# Patient Record
Sex: MALE | Race: BLACK OR AFRICAN AMERICAN | Employment: FULL TIME | URBAN - METROPOLITAN AREA
[De-identification: names, ages, dates, MRNs, and addresses within clinical notes are randomized per-mention and may not be internally consistent; named-entity substitution may affect disease eponyms.]

---

## 2018-03-31 ENCOUNTER — ED HISTORICAL/CONVERTED ENCOUNTER (OUTPATIENT)
Dept: OTHER | Age: 43
End: 2018-03-31

## 2019-05-08 LAB — HBA1C MFR BLD HPLC: NORMAL %

## 2019-07-24 ENCOUNTER — ED HISTORICAL/CONVERTED ENCOUNTER (OUTPATIENT)
Dept: OTHER | Age: 44
End: 2019-07-24

## 2019-07-24 LAB
ANION GAP BLD CALC-SCNC: 5 MMOL/L
BUN BLD-MCNC: 11 MG/DL
BUN/CREATININE RATIO, EXTERNAL: 7
CALCIUM, EXTERNAL: 9.1
CHLORIDE, EXTERNAL: 103
CO2, EXTERNAL: 30
CREATININE SER, EXTERNAL: 1.5 (ref ?–1.5)
EGFR IF AFA, EXTERNAL: >60
EGFR NOT AFA, EXTERNAL: 57 (ref 57–?)
GLUCOSE SER, EXTERNAL: 126 (ref ?–126)
POTASSIUM, EXTERNAL: 3.8
SODIUM, EXTERNAL: 138

## 2019-08-05 ENCOUNTER — OP HISTORICAL/CONVERTED ENCOUNTER (OUTPATIENT)
Dept: OTHER | Age: 44
End: 2019-08-05

## 2019-08-14 ENCOUNTER — OP HISTORICAL/CONVERTED ENCOUNTER (OUTPATIENT)
Dept: OTHER | Age: 44
End: 2019-08-14

## 2019-08-24 ENCOUNTER — ED HISTORICAL/CONVERTED ENCOUNTER (OUTPATIENT)
Dept: OTHER | Age: 44
End: 2019-08-24

## 2020-03-29 ENCOUNTER — OP HISTORICAL/CONVERTED ENCOUNTER (OUTPATIENT)
Dept: OTHER | Age: 45
End: 2020-03-29

## 2020-06-02 ENCOUNTER — OP HISTORICAL/CONVERTED ENCOUNTER (OUTPATIENT)
Dept: OTHER | Age: 45
End: 2020-06-02

## 2020-07-01 ENCOUNTER — OP HISTORICAL/CONVERTED ENCOUNTER (OUTPATIENT)
Dept: OTHER | Age: 45
End: 2020-07-01

## 2020-07-28 ENCOUNTER — TELEPHONE (OUTPATIENT)
Dept: FAMILY MEDICINE CLINIC | Age: 45
End: 2020-07-28

## 2020-07-28 NOTE — TELEPHONE ENCOUNTER
Patient called requesting a \"refill\" and an appointment. I returned his call and LVM stating I needed to know what medication he needs refilled and to schedule his appointment with the .

## 2020-07-29 ENCOUNTER — TELEPHONE (OUTPATIENT)
Dept: FAMILY MEDICINE CLINIC | Age: 45
End: 2020-07-29

## 2020-07-29 NOTE — TELEPHONE ENCOUNTER
Patient called and LVM on 07/29/2020 @ 1:52 PM stating he needs a refill on his \"stomach medication\".

## 2020-08-12 ENCOUNTER — OP HISTORICAL/CONVERTED ENCOUNTER (OUTPATIENT)
Dept: OTHER | Age: 45
End: 2020-08-12

## 2020-08-18 RX ORDER — LEVOTHYROXINE SODIUM 125 UG/1
TABLET ORAL
Qty: 90 TAB | Refills: 0 | Status: SHIPPED | OUTPATIENT
Start: 2020-08-18 | End: 2020-11-06 | Stop reason: SDUPTHER

## 2020-09-22 ENCOUNTER — TELEPHONE (OUTPATIENT)
Dept: FAMILY MEDICINE CLINIC | Age: 45
End: 2020-09-22

## 2020-09-22 NOTE — TELEPHONE ENCOUNTER
Patient called requesting a call back for an appointment. I returned his call and advised him to call the  to schedule. Clinical staff are currently not scheduling due to the new system. Patient voiced understanding.

## 2020-09-24 VITALS
TEMPERATURE: 98 F | DIASTOLIC BLOOD PRESSURE: 80 MMHG | HEIGHT: 71 IN | BODY MASS INDEX: 36.4 KG/M2 | SYSTOLIC BLOOD PRESSURE: 120 MMHG | WEIGHT: 260 LBS | HEART RATE: 70 BPM

## 2020-09-24 PROBLEM — R07.89 ATYPICAL CHEST PAIN: Status: ACTIVE | Noted: 2020-09-24

## 2020-09-24 PROBLEM — I10 ESSENTIAL HYPERTENSION: Status: ACTIVE | Noted: 2020-09-24

## 2020-09-24 PROBLEM — D72.819 LEUKOPENIA: Status: ACTIVE | Noted: 2020-09-24

## 2020-09-24 PROBLEM — E03.9 THYROID ACTIVITY DECREASED: Status: ACTIVE | Noted: 2020-09-24

## 2020-09-24 PROBLEM — R94.39 CARDIOVASCULAR STRESS TEST ABNORMAL: Status: ACTIVE | Noted: 2020-09-24

## 2020-09-24 PROBLEM — M54.17 LUMBOSACRAL RADICULOPATHY: Status: ACTIVE | Noted: 2020-09-24

## 2020-09-24 PROBLEM — R06.83 SNORING: Status: ACTIVE | Noted: 2020-09-24

## 2020-09-24 PROBLEM — N41.1 CHRONIC PROSTATITIS: Status: ACTIVE | Noted: 2020-09-24

## 2020-09-24 PROBLEM — M25.561 RIGHT KNEE PAIN: Status: ACTIVE | Noted: 2020-09-24

## 2020-09-24 PROBLEM — N18.2 CHRONIC KIDNEY DISEASE, STAGE 2 (MILD): Status: ACTIVE | Noted: 2020-09-24

## 2020-09-24 RX ORDER — KETOROLAC TROMETHAMINE 10 MG/1
TABLET, FILM COATED ORAL
COMMUNITY
End: 2020-11-06

## 2020-09-24 RX ORDER — METHOCARBAMOL 500 MG/1
TABLET, FILM COATED ORAL 4 TIMES DAILY
COMMUNITY
End: 2020-11-06

## 2020-09-24 RX ORDER — DICLOFENAC SODIUM 10 MG/G
GEL TOPICAL 4 TIMES DAILY
COMMUNITY
End: 2020-11-06

## 2020-09-24 RX ORDER — PANTOPRAZOLE SODIUM 40 MG/1
40 TABLET, DELAYED RELEASE ORAL DAILY
COMMUNITY
End: 2020-10-09 | Stop reason: SDUPTHER

## 2020-09-24 RX ORDER — LACTULOSE 10 G/15ML
SOLUTION ORAL; RECTAL 3 TIMES DAILY
COMMUNITY
End: 2021-05-11 | Stop reason: ALTCHOICE

## 2020-09-24 RX ORDER — LISINOPRIL 40 MG/1
40 TABLET ORAL DAILY
COMMUNITY
End: 2020-12-23

## 2020-09-24 RX ORDER — TAMSULOSIN HYDROCHLORIDE 0.4 MG/1
0.4 CAPSULE ORAL DAILY
COMMUNITY
End: 2021-02-05

## 2020-09-24 RX ORDER — IBUPROFEN 800 MG/1
TABLET ORAL
COMMUNITY
End: 2020-11-06

## 2020-09-24 RX ORDER — HYDROCORTISONE ACETATE 25 MG/1
25 SUPPOSITORY RECTAL EVERY 12 HOURS
COMMUNITY
End: 2020-11-06

## 2020-09-25 ENCOUNTER — VIRTUAL VISIT (OUTPATIENT)
Dept: FAMILY MEDICINE CLINIC | Age: 45
End: 2020-09-25

## 2020-09-25 DIAGNOSIS — E03.9 ACQUIRED HYPOTHYROIDISM: ICD-10-CM

## 2020-09-25 DIAGNOSIS — K21.9 GASTROESOPHAGEAL REFLUX DISEASE WITHOUT ESOPHAGITIS: ICD-10-CM

## 2020-09-25 DIAGNOSIS — M54.17 LUMBOSACRAL RADICULOPATHY: ICD-10-CM

## 2020-09-25 DIAGNOSIS — N40.0 BENIGN PROSTATIC HYPERPLASIA WITHOUT LOWER URINARY TRACT SYMPTOMS: ICD-10-CM

## 2020-09-25 DIAGNOSIS — I10 ESSENTIAL HYPERTENSION: Primary | ICD-10-CM

## 2020-09-25 NOTE — PATIENT INSTRUCTIONS
Patient currently at the hospital, has been instructed to give us a call once he gets discharge to go over medication from the hospital, discharge medications, discharge summaries as well as reevaluation of his chronic conditions, will address any refills as they may deem appropriate at that time

## 2020-09-25 NOTE — PROGRESS NOTES
Subjective:     Leah Copeland is a 41 y.o. male who presents for follow up of hypertension, hyperlipidemia and thyroidism. Diet and Lifestyle: generally follows a low fat low cholesterol diet, generally follows a low sodium diet, exercises regularly, smoker . Home BP Monitoring: is well controlled at home, ranging 120's/80's    Cardiovascular ROS: taking medications as instructed, no medication side effects noted, no TIA's, no chest pain on exertion, no dyspnea on exertion, no swelling of ankles. New concerns: Was recently diagnosed with COVID-19 on 9/14/2020. In the hospital since 9/23/2020, has some kidney damage due to dehydration, CT scan of the chest also shows kidney damage, but getting better. Not eating much, little cough, has pneumonia on the left lung. Patient Active Problem List   Diagnosis Code    Thyroid activity decreased E03.9    Right knee pain M25.561    Atypical chest pain R07.89    Cardiovascular stress test abnormal R94.39    Chronic kidney disease, stage 2 (mild) N18.2    Chronic prostatitis N41.1    Essential hypertension I10    Leukopenia D72.819    Lumbosacral radiculopathy M54.17    Snoring R06.83     Patient Active Problem List    Diagnosis Date Noted    Thyroid activity decreased 09/24/2020    Right knee pain 09/24/2020    Atypical chest pain 09/24/2020    Cardiovascular stress test abnormal 09/24/2020    Chronic kidney disease, stage 2 (mild) 09/24/2020    Chronic prostatitis 09/24/2020    Essential hypertension 09/24/2020    Leukopenia 09/24/2020    Lumbosacral radiculopathy 09/24/2020    Snoring 09/24/2020     Current Outpatient Medications   Medication Sig Dispense Refill    diclofenac (VOLTAREN) 1 % gel Apply  to affected area four (4) times daily.  hydrocortisone (ANUSOL-HC) 25 mg supp Insert 25 mg into rectum every twelve (12) hours.  ibuprofen (MOTRIN) 800 mg tablet Take  by mouth.  ketorolac (TORADOL) 10 mg tablet Take  by mouth.  lactulose (CHRONULAC) 10 gram/15 mL solution Take  by mouth three (3) times daily.  lisinopriL (PRINIVIL, ZESTRIL) 40 mg tablet Take 40 mg by mouth daily.  methocarbamoL (ROBAXIN) 500 mg tablet Take  by mouth four (4) times daily.  pantoprazole (PROTONIX) 40 mg tablet Take 40 mg by mouth daily.  tamsulosin (FLOMAX) 0.4 mg capsule Take 0.4 mg by mouth daily.  levothyroxine (SYNTHROID) 125 mcg tablet TAKE 1 TABLET BY MOUTH EVERY DAY *TAPER DOSE* 90 Tab 0     Allergies   Allergen Reactions    Vicodin [Hydrocodone-Acetaminophen] Other (comments)     hallucinations     Past Medical History:   Diagnosis Date    Atypical chest pain 2020    Cardiovascular stress test abnormal 2020    Chronic kidney disease, stage 2 (mild) 2020    Chronic prostatitis 2020    Dizziness     Essential hypertension 2020    Leukopenia 2020    Lumbosacral radiculopathy 2020    Right knee pain 2020    Snoring 2020    Stomach ulcer     Thyroid activity decreased 2020     Past Surgical History:   Procedure Laterality Date    HX WISDOM TEETH EXTRACTION       Family History   Problem Relation Age of Onset    Hypertension Mother     Diabetes Father         insulin dependent     Hypertension Father     Stroke Paternal Aunt 80    Cancer Paternal Uncle         liver cancer (heavy drinker)     Social History     Tobacco Use    Smoking status: Former Smoker     Last attempt to quit: 2009     Years since quittin.7    Smokeless tobacco: Never Used   Substance Use Topics    Alcohol use: Yes     Comment: Last drink 2019 at his sister's weddding.  3-4 mixed drinks on the weekends, distant history of heavy drinking and drug use, quit all 2003        No results found for: WBC, WBCT, WBCPOC, HGB, HGBPOC, HCT, HCTPOC, PLT, PLTPOC, MCV, MCVPOC, HGBEXT, HCTEXT, PLTEXT  Lab Results   Component Value Date/Time    Hemoglobin A1c, External A1C 5.9  EAG 123 05/08/2019      No results found for: CHOL, CHOLPOCT, HDL, LDL, LDLC, LDLCPOC, LDLCEXT, TRIGL, TGLPOCT, CHHD, CHHDX  No results found for: TSH, TSH2, TSH3, TSHP, TSHELE, TSHEXT, TT3, T3U, T3UP, FRT3, FT3, FT4, FT4P, T4, T4P, FT4T, TT7, TSHEXT      Review of Systems, additional:  A comprehensive review of systems was negative except for that written in the HPI. Objective: There were no vitals taken for this visit. Appearance: oriented to person, place, and time, normal appearing weight and acyanotic, in no respiratory distress. Lab review: labs are reviewed, up to date and normal.     Assessment/Plan:     Patient currently at the hospital, has been instructed to give us a call once he gets discharge to go over medication from the hospital, discharge medications, discharge summaries as well as reevaluation of his chronic conditions, will address any refills as they may deem appropriate at that time    ICD-10-CM ICD-9-CM    1. Essential hypertension  I10 401.9    2. Acquired hypothyroidism  E03.9 244.9    3. Lumbosacral radiculopathy  M54.17 724.4    4. Benign prostatic hyperplasia without lower urinary tract symptoms  N40.0 600.00    5.  Gastroesophageal reflux disease without esophagitis  K21.9 530.81

## 2020-10-09 ENCOUNTER — VIRTUAL VISIT (OUTPATIENT)
Dept: FAMILY MEDICINE CLINIC | Age: 45
End: 2020-10-09
Payer: MEDICAID

## 2020-10-09 DIAGNOSIS — I10 HYPERTENSION, ESSENTIAL: ICD-10-CM

## 2020-10-09 DIAGNOSIS — E53.8 B12 DEFICIENCY: ICD-10-CM

## 2020-10-09 DIAGNOSIS — U07.1 CLINICAL DIAGNOSIS OF COVID-19: ICD-10-CM

## 2020-10-09 DIAGNOSIS — E55.9 VITAMIN D DEFICIENCY: Primary | ICD-10-CM

## 2020-10-09 DIAGNOSIS — K21.9 GASTROESOPHAGEAL REFLUX DISEASE WITHOUT ESOPHAGITIS: ICD-10-CM

## 2020-10-09 PROCEDURE — 1111F DSCHRG MED/CURRENT MED MERGE: CPT | Performed by: NURSE PRACTITIONER

## 2020-10-09 PROCEDURE — 99495 TRANSJ CARE MGMT MOD F2F 14D: CPT | Performed by: NURSE PRACTITIONER

## 2020-10-09 RX ORDER — MELATONIN
1000 DAILY
Qty: 90 TAB | Refills: 0 | Status: SHIPPED | OUTPATIENT
Start: 2020-10-09 | End: 2020-11-06

## 2020-10-09 RX ORDER — PANTOPRAZOLE SODIUM 40 MG/1
40 TABLET, DELAYED RELEASE ORAL DAILY
Qty: 90 TAB | Refills: 3 | Status: SHIPPED | OUTPATIENT
Start: 2020-10-09 | End: 2020-10-28 | Stop reason: ALTCHOICE

## 2020-10-09 RX ORDER — LANOLIN ALCOHOL/MO/W.PET/CERES
500 CREAM (GRAM) TOPICAL DAILY
Qty: 90 TAB | Refills: 0 | Status: SHIPPED | OUTPATIENT
Start: 2020-10-09 | End: 2021-01-07

## 2020-10-09 NOTE — PATIENT INSTRUCTIONS
At this time still recovering from [place] will continue to monitor symptoms. Medication has been reviewed and discussed with patient We will continue with amlodipine for blood pressure management and Eliquis for prophylaxis of blood clots At this time okay to resume with Protonix for acid reflux We will do labs to further evaluate chronic conditions Continue with all other medication as directed Follow-up in 1 month for reevaluation

## 2020-10-09 NOTE — PROGRESS NOTES
Transitional Care Management Progress Note    Patient: Junaid Pickett : 1975  PCP: Salina Bernardo NP    Date of admission:   Date of discharge:     Patient was contacted by Transitional Care Management services within two days after his discharge: Yes. This encounter and supporting documentation was reviewed if available. Medication reconciliation was performed today (10/9/2020). Assessment/Plan:     Diagnoses and all orders for this visit:    1. Vitamin D deficiency  -     VITAMIN D, 25 HYDROXY  -     [place] DISCHARGE MEDS RECONCILED W/ CURRENT OUTPATIENT MED LIST    2. Gastroesophageal reflux disease without esophagitis  -     pantoprazole (PROTONIX) 40 mg tablet; Take 1 Tab by mouth daily.  -     AZ DISCHARGE MEDS RECONCILED W/ CURRENT OUTPATIENT MED LIST    3. Hypertension, essential  -     CBC WITH AUTOMATED DIFF  -     METABOLIC PANEL, COMPREHENSIVE  -     LIPID PANEL  -     THYROID CASCADE PROFILE  -     MICROALBUMIN, UR, RAND W/ MICROALB/CREAT RATIO  -     AZ DISCHARGE MEDS RECONCILED W/ CURRENT OUTPATIENT MED LIST    4. B12 deficiency  -     VITAMIN B12  -     AZ DISCHARGE MEDS RECONCILED W/ CURRENT OUTPATIENT MED LIST    5. Clinical diagnosis of COVID-19  -     [place] DISCHARGE MEDS RECONCILED W/ CURRENT OUTPATIENT MED LIST      The complexity of medical decision making for this patient's transitional care is moderate   Follow-up and Dispositions    · Return in about 4 weeks (around 2020) for in person visit, post covid, lab review, post ANGELO covid. Subjective:   Junaid Gomez. is a 41 y.o. male presenting today for follow-up after being discharged from [facility]. The discharge summary was reviewed or requested. The main problem requiring admission was Covid 19 complications acute kidney injury.    Complications during admission: none    Interval history/Current status: D/c on 2020, still having headache, lost 44 lbs, have not much appetite currently only eating chicken and mash potatoes, has f/u with ortho on 10/13/2020, has follow-up with kidney specialist on November 3  The hospital stopped lisinopril, now amlodipine, eliquis 2.5,     Admitting symptoms have: significantly improved    Medications marked \"taking\" at this time:  Home Medications    Medication Sig Start Date End Date Taking? Authorizing Provider   pantoprazole (PROTONIX) 40 mg tablet Take 1 Tab by mouth daily. 10/9/20  Yes Leanna Muller NP   diclofenac (VOLTAREN) 1 % gel Apply  to affected area four (4) times daily. Provider, Historical   hydrocortisone (ANUSOL-HC) 25 mg supp Insert 25 mg into rectum every twelve (12) hours. Provider, Historical   ibuprofen (MOTRIN) 800 mg tablet Take  by mouth. Provider, Historical   ketorolac (TORADOL) 10 mg tablet Take  by mouth. Provider, Historical   lactulose (CHRONULAC) 10 gram/15 mL solution Take  by mouth three (3) times daily. Provider, Historical   lisinopriL (PRINIVIL, ZESTRIL) 40 mg tablet Take 40 mg by mouth daily. Provider, Historical   methocarbamoL (ROBAXIN) 500 mg tablet Take  by mouth four (4) times daily. Provider, Historical   tamsulosin (FLOMAX) 0.4 mg capsule Take 0.4 mg by mouth daily. Provider, Historical   pantoprazole (PROTONIX) 40 mg tablet Take 40 mg by mouth daily. 10/9/20  Provider, Historical   levothyroxine (SYNTHROID) 125 mcg tablet TAKE 1 TABLET BY MOUTH EVERY DAY *TAPER DOSE* 1/19/01   Jennyfer Sierra NP        Review of Systems:  Negative except leg, anorexia, headaches.       Patient Active Problem List   Diagnosis Code    Thyroid activity decreased E03.9    Right knee pain M25.561    Atypical chest pain R07.89    Cardiovascular stress test abnormal R94.39    Chronic kidney disease, stage 2 (mild) N18.2    Chronic prostatitis N41.1    Essential hypertension I10    Leukopenia D72.819    Lumbosacral radiculopathy M54.17    Snoring R06.83     Patient Active Problem List    Diagnosis Date Noted    Thyroid activity decreased 09/24/2020    Right knee pain 09/24/2020    Atypical chest pain 09/24/2020    Cardiovascular stress test abnormal 09/24/2020    Chronic kidney disease, stage 2 (mild) 09/24/2020    Chronic prostatitis 09/24/2020    Essential hypertension 09/24/2020    Leukopenia 09/24/2020    Lumbosacral radiculopathy 09/24/2020    Snoring 09/24/2020     Current Outpatient Medications   Medication Sig Dispense Refill    pantoprazole (PROTONIX) 40 mg tablet Take 1 Tab by mouth daily. 90 Tab 3    diclofenac (VOLTAREN) 1 % gel Apply  to affected area four (4) times daily.  hydrocortisone (ANUSOL-HC) 25 mg supp Insert 25 mg into rectum every twelve (12) hours.  ibuprofen (MOTRIN) 800 mg tablet Take  by mouth.  ketorolac (TORADOL) 10 mg tablet Take  by mouth.  lactulose (CHRONULAC) 10 gram/15 mL solution Take  by mouth three (3) times daily.  lisinopriL (PRINIVIL, ZESTRIL) 40 mg tablet Take 40 mg by mouth daily.  methocarbamoL (ROBAXIN) 500 mg tablet Take  by mouth four (4) times daily.  tamsulosin (FLOMAX) 0.4 mg capsule Take 0.4 mg by mouth daily.       levothyroxine (SYNTHROID) 125 mcg tablet TAKE 1 TABLET BY MOUTH EVERY DAY *TAPER DOSE* 90 Tab 0     Allergies   Allergen Reactions    Vicodin [Hydrocodone-Acetaminophen] Other (comments)     hallucinations     Past Medical History:   Diagnosis Date    Atypical chest pain 9/24/2020    Cardiovascular stress test abnormal 9/24/2020    Chronic kidney disease, stage 2 (mild) 9/24/2020    Chronic prostatitis 9/24/2020    Dizziness     Essential hypertension 9/24/2020    Leukopenia 9/24/2020    Lumbosacral radiculopathy 9/24/2020    Right knee pain 9/24/2020    Snoring 9/24/2020    Stomach ulcer     Thyroid activity decreased 9/24/2020     Past Surgical History:   Procedure Laterality Date    HX WISDOM TEETH EXTRACTION  2014     Family History   Problem Relation Age of Onset    Hypertension Mother    Neymar Schwarz Diabetes Father         insulin dependent     Hypertension Father     Stroke Paternal Aunt [address]    Cancer Paternal Uncle         liver cancer (heavy drinker)     Social History     Tobacco Use    Smoking status: Former Smoker     Last attempt to quit: 2009     Years since quittin.7    Smokeless tobacco: Never Used   Substance Use Topics    Alcohol use: Yes     Comment: Last drink 2019 at his sister's weddding. 3-4 mixed drinks on the weekends, distant history of heavy drinking and drug use, quit all 2003          Objective:     Patient-Reported Vitals 10/9/2020   Patient-Reported Weight 244   Patient-Reported Pulse 80   Patient-Reported Temperature 98.3   Patient-Reported Systolic  683   Patient-Reported Diastolic 80      General: alert, cooperative, no distress   Mental  status: normal mood, behavior, speech, dress, motor activity, and thought processes, able to follow commands   HENT: NCAT   Neck: no visualized mass   Resp: no respiratory distress   Neuro: no gross deficits   Skin: no discoloration or lesions of concern on visible areas   Psychiatric: normal affect, consistent with stated mood, no evidence of hallucinations     Additional exam findings: At this time still recovering from [facility] will continue to monitor symptoms. Medication has been reviewed and discussed with patient    We will continue with amlodipine for blood pressure management and Eliquis for prophylaxis of blood clots    At this time okay to resume with Protonix for acid reflux    We will do labs to further evaluate chronic conditions    Continue with all other medication as directed    Follow-up in 1 month for reevaluation      We discussed the expected course, resolution and complications of the diagnosis(es) in detail. Medication risks, benefits, costs, interactions, and alternatives were discussed as indicated.   I advised him to contact the office if his condition worsens, changes or fails to improve as anticipated. He expressed understanding with the diagnosis(es) and plan. Aye Rolle, who was evaluated through a synchronous (real-time) audio-video encounter and/or his healthcare decision maker, is aware that it is a billable service, with coverage as determined by his insurance carrier. He provided verbal consent to proceed: Yes, and patient identification was verified. It was conducted pursuant to the emergency declaration under the [address] [place] [address] and the [employer] General Act. A caregiver was present when appropriate. Ability to conduct physical exam was limited. I was in the office. The patient was at home.       Ayla Sherman NP

## 2020-10-14 ENCOUNTER — TELEPHONE (OUTPATIENT)
Dept: FAMILY MEDICINE CLINIC | Age: 45
End: 2020-10-14

## 2020-10-14 NOTE — TELEPHONE ENCOUNTER
Patient called and LVM questioning how to take the Vit D 3 - He was suppose to take 1000 daily but it says 25 mcg. I returned his call and explained it is 25  Mcg and 1000 units per tablet. To take one tablet as prescribed. Patient asked about B 12 being 500 mg and supposed to take 1000 and just wanted to verify the takes 2 tablets to equal the 1000 dose. Patient voiced understanding of how to take both medications.

## 2020-10-15 LAB
25(OH)D3+25(OH)D2 SERPL-MCNC: 18.2 NG/ML (ref 30–100)
ALBUMIN SERPL-MCNC: 4.6 G/DL (ref 4–5)
ALBUMIN/CREAT UR: 38 MG/G CREAT (ref 0–29)
ALBUMIN/GLOB SERPL: 1.5 {RATIO} (ref 1.2–2.2)
ALP SERPL-CCNC: 81 IU/L (ref 39–117)
ALT SERPL-CCNC: 36 IU/L (ref 0–44)
AST SERPL-CCNC: 28 IU/L (ref 0–40)
BASOPHILS # BLD AUTO: 0.1 X10E3/UL (ref 0–0.2)
BASOPHILS NFR BLD AUTO: 1 %
BILIRUB SERPL-MCNC: 0.6 MG/DL (ref 0–1.2)
BUN SERPL-MCNC: 19 MG/DL (ref 6–24)
BUN/CREAT SERPL: 11 (ref 9–20)
CALCIUM SERPL-MCNC: 10 MG/DL (ref 8.7–10.2)
CHLORIDE SERPL-SCNC: 103 MMOL/L (ref 96–106)
CHOLEST SERPL-MCNC: 157 MG/DL (ref 100–199)
CO2 SERPL-SCNC: 26 MMOL/L (ref 20–29)
CREAT SERPL-MCNC: 1.67 MG/DL (ref 0.76–1.27)
CREAT UR-MCNC: 169.7 MG/DL
EOSINOPHIL # BLD AUTO: 0.1 X10E3/UL (ref 0–0.4)
EOSINOPHIL NFR BLD AUTO: 3 %
ERYTHROCYTE [DISTWIDTH] IN BLOOD BY AUTOMATED COUNT: 12.8 % (ref 11.6–15.4)
GLOBULIN SER CALC-MCNC: 3 G/DL (ref 1.5–4.5)
GLUCOSE SERPL-MCNC: 117 MG/DL (ref 65–99)
HCT VFR BLD AUTO: 43.4 % (ref 37.5–51)
HDLC SERPL-MCNC: 41 MG/DL
HGB BLD-MCNC: 14.1 G/DL (ref 13–17.7)
IMM GRANULOCYTES # BLD AUTO: 0 X10E3/UL (ref 0–0.1)
IMM GRANULOCYTES NFR BLD AUTO: 0 %
LDLC SERPL CALC-MCNC: 100 MG/DL (ref 0–99)
LYMPHOCYTES # BLD AUTO: 1.1 X10E3/UL (ref 0.7–3.1)
LYMPHOCYTES NFR BLD AUTO: 30 %
MCH RBC QN AUTO: 28.7 PG (ref 26.6–33)
MCHC RBC AUTO-ENTMCNC: 32.5 G/DL (ref 31.5–35.7)
MCV RBC AUTO: 88 FL (ref 79–97)
MICROALBUMIN UR-MCNC: 64.4 UG/ML
MONOCYTES # BLD AUTO: 0.4 X10E3/UL (ref 0.1–0.9)
MONOCYTES NFR BLD AUTO: 12 %
NEUTROPHILS # BLD AUTO: 1.9 X10E3/UL (ref 1.4–7)
NEUTROPHILS NFR BLD AUTO: 54 %
PLATELET # BLD AUTO: 389 X10E3/UL (ref 150–450)
POTASSIUM SERPL-SCNC: 4.7 MMOL/L (ref 3.5–5.2)
PROT SERPL-MCNC: 7.6 G/DL (ref 6–8.5)
RBC # BLD AUTO: 4.92 X10E6/UL (ref 4.14–5.8)
SODIUM SERPL-SCNC: 143 MMOL/L (ref 134–144)
TRIGL SERPL-MCNC: 85 MG/DL (ref 0–149)
TSH SERPL DL<=0.005 MIU/L-ACNC: 2.99 UIU/ML (ref 0.45–4.5)
VIT B12 SERPL-MCNC: 803 PG/ML (ref 232–1245)
VLDLC SERPL CALC-MCNC: 16 MG/DL (ref 5–40)
WBC # BLD AUTO: 3.6 X10E3/UL (ref 3.4–10.8)

## 2020-10-21 NOTE — PROGRESS NOTES
His vitamin D is very low, Im sending the high dose vitamin D and he is to take once a week for 3 months.  His kidney functions still elevated, he is to follow up with his kidney specialist. All other results are normal.

## 2020-10-23 ENCOUNTER — TELEPHONE (OUTPATIENT)
Dept: FAMILY MEDICINE CLINIC | Age: 45
End: 2020-10-23

## 2020-10-23 DIAGNOSIS — K21.9 GASTROESOPHAGEAL REFLUX DISEASE WITHOUT ESOPHAGITIS: Primary | ICD-10-CM

## 2020-10-23 RX ORDER — ESOMEPRAZOLE MAGNESIUM 40 MG/1
40 CAPSULE, DELAYED RELEASE ORAL DAILY
Qty: 90 CAP | Refills: 1 | Status: SHIPPED | OUTPATIENT
Start: 2020-10-23 | End: 2020-10-28 | Stop reason: ALTCHOICE

## 2020-10-23 NOTE — PROGRESS NOTES
Called and spoke to patient. Patient voiced understanding. He is scheduled to see the nephrologist 10/09/2020.

## 2020-10-27 ENCOUNTER — TELEPHONE (OUTPATIENT)
Dept: FAMILY MEDICINE CLINIC | Age: 45
End: 2020-10-27

## 2020-10-27 NOTE — TELEPHONE ENCOUNTER
Called and spoke to patient. Patient needs a Prior Auth for his nexium and a new order for the Vitamin D 81810 once weekly.

## 2020-10-28 DIAGNOSIS — E55.9 VITAMIN D DEFICIENCY: Primary | ICD-10-CM

## 2020-10-28 RX ORDER — ERGOCALCIFEROL 1.25 MG/1
50000 CAPSULE ORAL
Qty: 12 CAP | Refills: 0 | Status: SHIPPED | OUTPATIENT
Start: 2020-10-28 | End: 2021-02-02

## 2020-10-29 NOTE — TELEPHONE ENCOUNTER
I reviewed the [address] online, the following medications are covered:    Omeprazole 20 mg delayed release capsules    Omeprazole 10 mg delayed release capsules    Omeprazole 40 mg delayed release capsules    Pantoprazole sodium 20 mg delayed release tablets    Pantoprazole sodium 40 mg delayed release tablets

## 2020-11-02 DIAGNOSIS — K21.9 GASTROESOPHAGEAL REFLUX DISEASE WITHOUT ESOPHAGITIS: Primary | ICD-10-CM

## 2020-11-02 RX ORDER — FAMOTIDINE 20 MG/1
20 TABLET, FILM COATED ORAL
Qty: 90 TAB | Refills: 1 | Status: SHIPPED | OUTPATIENT
Start: 2020-11-02 | End: 2021-05-15

## 2020-11-02 RX ORDER — PANTOPRAZOLE SODIUM 40 MG/1
40 TABLET, DELAYED RELEASE ORAL DAILY
Qty: 90 TAB | Refills: 1 | Status: SHIPPED | OUTPATIENT
Start: 2020-11-02 | End: 2021-02-05

## 2020-11-03 DIAGNOSIS — M25.561 PAIN IN RIGHT KNEE: ICD-10-CM

## 2020-11-06 ENCOUNTER — OFFICE VISIT (OUTPATIENT)
Dept: FAMILY MEDICINE CLINIC | Age: 45
End: 2020-11-06
Payer: MEDICAID

## 2020-11-06 VITALS
RESPIRATION RATE: 18 BRPM | HEART RATE: 88 BPM | BODY MASS INDEX: 38.36 KG/M2 | OXYGEN SATURATION: 98 % | TEMPERATURE: 99.3 F | WEIGHT: 259 LBS | HEIGHT: 69 IN | DIASTOLIC BLOOD PRESSURE: 72 MMHG | SYSTOLIC BLOOD PRESSURE: 122 MMHG

## 2020-11-06 DIAGNOSIS — E66.01 SEVERE OBESITY (HCC): ICD-10-CM

## 2020-11-06 DIAGNOSIS — E03.9 ACQUIRED HYPOTHYROIDISM: ICD-10-CM

## 2020-11-06 DIAGNOSIS — K21.9 GASTROESOPHAGEAL REFLUX DISEASE WITHOUT ESOPHAGITIS: ICD-10-CM

## 2020-11-06 DIAGNOSIS — G89.29 CHRONIC PAIN OF RIGHT KNEE: ICD-10-CM

## 2020-11-06 DIAGNOSIS — I10 ESSENTIAL HYPERTENSION: Primary | ICD-10-CM

## 2020-11-06 DIAGNOSIS — M25.561 CHRONIC PAIN OF RIGHT KNEE: ICD-10-CM

## 2020-11-06 DIAGNOSIS — N18.2 CHRONIC KIDNEY DISEASE, STAGE 2 (MILD): ICD-10-CM

## 2020-11-06 DIAGNOSIS — E78.5 HYPERLIPIDEMIA, UNSPECIFIED HYPERLIPIDEMIA TYPE: ICD-10-CM

## 2020-11-06 DIAGNOSIS — M51.36 BULGING OF LUMBAR INTERVERTEBRAL DISC: ICD-10-CM

## 2020-11-06 PROCEDURE — 99214 OFFICE O/P EST MOD 30 MIN: CPT | Performed by: NURSE PRACTITIONER

## 2020-11-06 RX ORDER — PREDNISONE 10 MG/1
TABLET ORAL
Qty: 21 TAB | Refills: 0 | Status: SHIPPED | OUTPATIENT
Start: 2020-11-06 | End: 2021-02-05

## 2020-11-06 RX ORDER — ACETAMINOPHEN AND CODEINE PHOSPHATE 300; 30 MG/1; MG/1
TABLET ORAL
Qty: 12 TAB | Refills: 1 | Status: SHIPPED | OUTPATIENT
Start: 2020-11-06 | End: 2020-11-09

## 2020-11-06 RX ORDER — LEVOTHYROXINE SODIUM 125 UG/1
TABLET ORAL
Qty: 90 TAB | Refills: 0 | Status: SHIPPED | OUTPATIENT
Start: 2020-11-06 | End: 2021-02-05 | Stop reason: ALTCHOICE

## 2020-11-06 NOTE — PROGRESS NOTES
Subjective:     Jaci Swann is a 41 y.o. male who presents for follow up of hypertension, hyperlipidemia and hypothyroidism, CKD post Covid. Diet and Lifestyle: generally follows a low fat low cholesterol diet, generally follows a low sodium diet, exercises regularly, smoker . Home BP Monitoring: is not measured at home    Cardiovascular ROS: taking medications as instructed, no medication side effects noted, no TIA's, no chest pain on exertion, no dyspnea on exertion, no swelling of ankles. New concerns: Reports feels much better currently than a month ago. Is able to go to work and perform activities of daily living with minimal discomfort. Only concern at this time is low back pain that started suddenly when he bent down to  an object and suddenly felt a pop on the lower back and now with pain that radiates to the lower extremity. No numbness or tingling or weakness reported.      Patient Active Problem List   Diagnosis Code    Thyroid activity decreased E03.9    Right knee pain M25.561    Atypical chest pain R07.89    Cardiovascular stress test abnormal R94.39    Chronic kidney disease, stage 2 (mild) N18.2    Chronic prostatitis N41.1    Essential hypertension I10    Leukopenia D72.819    Lumbosacral radiculopathy M54.17    Snoring R06.83    Severe obesity (HCC) E66.01     Patient Active Problem List    Diagnosis Date Noted    Severe obesity ([address] 11/06/2020    Thyroid activity decreased 09/24/2020    Right knee pain 09/24/2020    Atypical chest pain 09/24/2020    Cardiovascular stress test abnormal 09/24/2020    Chronic kidney disease, stage 2 (mild) 09/24/2020    Chronic prostatitis 09/24/2020    Essential hypertension 09/24/2020    Leukopenia 09/24/2020    Lumbosacral radiculopathy 09/24/2020    Snoring 09/24/2020     Current Outpatient Medications   Medication Sig Dispense Refill    predniSONE (STERAPRED DS) 10 mg dose pack See administration instruction per 10mg dose pack 21 Tab 0    levothyroxine (SYNTHROID) 125 mcg tablet TAKE 1 TABLET BY MOUTH EVERY DAY 90 Tab 0    pantoprazole (PROTONIX) 40 mg tablet Take 1 Tab by mouth daily. 90 Tab 1    famotidine (PEPCID) 20 mg tablet Take 1 Tab by mouth nightly. 90 Tab 1    ergocalciferol (ERGOCALCIFEROL) 1,250 mcg (50,000 unit) capsule Take 1 Cap by mouth every seven (7) days. 12 Cap 0    cyanocobalamin (VITAMIN B12) 500 mcg tablet Take 1 Tab by mouth daily. Indications: prevention of vitamin B12 deficiency 90 Tab 0    lactulose (CHRONULAC) 10 gram/15 mL solution Take  by mouth three (3) times daily.  lisinopriL (PRINIVIL, ZESTRIL) 40 mg tablet Take 40 mg by mouth daily.  tamsulosin (FLOMAX) 0.4 mg capsule Take 0.4 mg by mouth daily.       acetaminophen-codeine (TYLENOL #3) 300-30 mg per tablet TAKE 1 TABLET BY MOUTH EVERY 6 HOURS FOR 3 DAYS 12 Tab 1     Allergies   Allergen Reactions    Vicodin [Hydrocodone-Acetaminophen] Other (comments)     hallucinations     Past Medical History:   Diagnosis Date    Atypical chest pain 9/24/2020    Cardiovascular stress test abnormal 9/24/2020    Chronic kidney disease, stage 2 (mild) 9/24/2020    Chronic prostatitis 9/24/2020    COVID-19     Dizziness     Essential hypertension 9/24/2020    Kidney disease, chronic, stage II (GFR 60-89 ml/min)     Leukopenia 9/24/2020    Lumbosacral radiculopathy 9/24/2020    Right knee pain 9/24/2020    Snoring 9/24/2020    Stomach ulcer     Thyroid activity decreased 9/24/2020     Past Surgical History:   Procedure Laterality Date    HX ORTHOPAEDIC Right     meniscus repair    HX WISDOM TEETH EXTRACTION  2014     Family History   Problem Relation Age of Onset    Hypertension Mother     Diabetes Father         insulin dependent     Hypertension Father     Stroke Paternal Aunt 80    Cancer Paternal Uncle         liver cancer (heavy drinker)     Social History     Tobacco Use    Smoking status: Former Smoker     Last attempt to quit: 2009     Years since quittin.8    Smokeless tobacco: Never Used   Substance Use Topics    Alcohol use: Yes     Comment: Last drink 2019 at his sister's weddding. 3-4 mixed drinks on the weekends, distant history of heavy drinking and drug use, quit all 2003        Lab Results   Component Value Date/Time    WBC 3.6 10/13/2020 10:04 AM    HGB 14.1 10/13/2020 10:04 AM    HCT 43.4 10/13/2020 10:04 AM    PLATELET 855  10:04 AM    MCV 88 10/13/2020 10:04 AM     Lab Results   Component Value Date/Time    Hemoglobin A1c, External A1C 5.9  EAG  123 2019    Glucose 117 (H) 10/13/2020 10:04 AM    Microalb/Creat ratio (ug/mg creat.) 38 (H) 10/13/2020 10:04 AM    LDL Chol Calc (NIH) 100 (H) 10/13/2020 10:04 AM    Creatinine 1.67 (H) 10/13/2020 10:04 AM      Lab Results   Component Value Date/Time    Cholesterol, total 157 10/13/2020 10:04 AM    HDL Cholesterol 41 10/13/2020 10:04 AM    LDL Chol Calc (NIH) 100 (H) 10/13/2020 10:04 AM    Triglyceride 85 10/13/2020 10:04 AM     Lab Results   Component Value Date/Time    TSH 2.990 10/13/2020 10:04 AM         Review of Systems, additional:  A comprehensive review of systems was negative except for that written in the HPI. Objective:     Visit Vitals  /72 (BP 1 Location: Right arm, BP Patient Position: Sitting)   Pulse 88   Temp 99.3 °F (37.4 °C) (Oral)   Resp 18   Ht 5' 9\" (1.753 m)   Wt 259 lb (117.5 kg)   SpO2 98%   BMI 38.25 kg/m²     Appearance: alert, well appearing, and in no distress, oriented to person, place, and time, normal appearing weight and acyanotic, in no respiratory distress.   General exam: CVS exam BP noted to be well controlled today in office, S1, S2 normal, no gallop, no murmur, chest clear, no JVD, no HSM, no edema, diabetic exam heart sounds normal rate, regular rhythm, normal S1, S2, no murmurs, rubs, clicks or gallops, chest clear, no hepatosplenomegaly, no carotid bruits, peripheral vascular exam color, temperature, sensation in feet normal, no lesions, neurological exam alert, oriented, normal speech, no focal findings or movement disorder noted, screening mental status exam normal, neck supple without rigidity, motor and sensory grossly normal bilaterally, normal muscle tone, no tremors, strength 5/5. Lab review: labs are reviewed, up to date and normal.     Assessment/Plan: At this time stable, we will not make any changes to current regimen    Patient due for labs to reevaluate chronic conditions    Get labs done in the next few dates    Continue with medication as directed    Follow-up in 3 months for reevaluation or sooner as needed    ICD-10-CM ICD-9-CM    1. Essential hypertension  I10 401.9 CBC WITH AUTOMATED DIFF      METABOLIC PANEL, COMPREHENSIVE   2. Severe obesity ([place])  E66.01 278.01    3. Acquired hypothyroidism  E03.9 244.9 THYROID CASCADE PROFILE      levothyroxine (SYNTHROID) 125 mcg tablet   4. Chronic pain of right knee  M25.561 719.46     G89.29 338.29    5. Gastroesophageal reflux disease without esophagitis  K21.9 530.81    6. Chronic kidney disease, stage 2 (mild)  N18.2 585.2    7. Bulging of lumbar intervertebral disc  M51.26 722.10 predniSONE (STERAPRED DS) 10 mg dose pack   8.  Hyperlipidemia, unspecified hyperlipidemia type  E78.5 272.4 LIPID PANEL

## 2020-12-09 ENCOUNTER — TELEPHONE (OUTPATIENT)
Dept: FAMILY MEDICINE CLINIC | Age: 45
End: 2020-12-09

## 2020-12-09 NOTE — TELEPHONE ENCOUNTER
I returned his call. Patient has been having trouble sleeping for years. He will fall asleep for an hour and then he wakes up. He is taking melatonin but it only lasts a short while. He stated he has not had a full 6 hours of sleep in years. He is wanting to know what else he can do. He said that he is tired and body is tired. Please advise. He completed his blood work on 12/09/2020. Please advise.

## 2020-12-10 LAB
ALBUMIN SERPL-MCNC: 4.5 G/DL (ref 4–5)
ALBUMIN/GLOB SERPL: 2 {RATIO} (ref 1.2–2.2)
ALP SERPL-CCNC: 81 IU/L (ref 39–117)
ALT SERPL-CCNC: 18 IU/L (ref 0–44)
AST SERPL-CCNC: 22 IU/L (ref 0–40)
BASOPHILS # BLD AUTO: 0.1 X10E3/UL (ref 0–0.2)
BASOPHILS NFR BLD AUTO: 2 %
BILIRUB SERPL-MCNC: 0.6 MG/DL (ref 0–1.2)
BUN SERPL-MCNC: 15 MG/DL (ref 6–24)
BUN/CREAT SERPL: 9 (ref 9–20)
CALCIUM SERPL-MCNC: 9.3 MG/DL (ref 8.7–10.2)
CHLORIDE SERPL-SCNC: 103 MMOL/L (ref 96–106)
CHOLEST SERPL-MCNC: 151 MG/DL (ref 100–199)
CO2 SERPL-SCNC: 27 MMOL/L (ref 20–29)
CREAT SERPL-MCNC: 1.58 MG/DL (ref 0.76–1.27)
EOSINOPHIL # BLD AUTO: 0.1 X10E3/UL (ref 0–0.4)
EOSINOPHIL NFR BLD AUTO: 3 %
ERYTHROCYTE [DISTWIDTH] IN BLOOD BY AUTOMATED COUNT: 14.2 % (ref 11.6–15.4)
GLOBULIN SER CALC-MCNC: 2.2 G/DL (ref 1.5–4.5)
GLUCOSE SERPL-MCNC: 124 MG/DL (ref 65–99)
HCT VFR BLD AUTO: 41.5 % (ref 37.5–51)
HDLC SERPL-MCNC: 43 MG/DL
HGB BLD-MCNC: 14 G/DL (ref 13–17.7)
IMM GRANULOCYTES # BLD AUTO: 0 X10E3/UL (ref 0–0.1)
IMM GRANULOCYTES NFR BLD AUTO: 0 %
INTERPRETIVE COMMENT, 330017: ABNORMAL
LDLC SERPL CALC-MCNC: 89 MG/DL (ref 0–99)
LYMPHOCYTES # BLD AUTO: 1.1 X10E3/UL (ref 0.7–3.1)
LYMPHOCYTES NFR BLD AUTO: 30 %
MCH RBC QN AUTO: 29.9 PG (ref 26.6–33)
MCHC RBC AUTO-ENTMCNC: 33.7 G/DL (ref 31.5–35.7)
MCV RBC AUTO: 89 FL (ref 79–97)
MONOCYTES # BLD AUTO: 0.2 X10E3/UL (ref 0.1–0.9)
MONOCYTES NFR BLD AUTO: 7 %
NEUTROPHILS # BLD AUTO: 2.1 X10E3/UL (ref 1.4–7)
NEUTROPHILS NFR BLD AUTO: 58 %
PLATELET # BLD AUTO: 231 X10E3/UL (ref 150–450)
POTASSIUM SERPL-SCNC: 3.9 MMOL/L (ref 3.5–5.2)
PROT SERPL-MCNC: 6.7 G/DL (ref 6–8.5)
RBC # BLD AUTO: 4.68 X10E6/UL (ref 4.14–5.8)
SODIUM SERPL-SCNC: 142 MMOL/L (ref 134–144)
T4 FREE SERPL-MCNC: 1.13 NG/DL (ref 0.82–1.77)
THYROPEROXIDASE AB SERPL-ACNC: 362 IU/ML (ref 0–34)
TRIGL SERPL-MCNC: 101 MG/DL (ref 0–149)
TSH SERPL DL<=0.005 MIU/L-ACNC: 6.07 UIU/ML (ref 0.45–4.5)
VLDLC SERPL CALC-MCNC: 19 MG/DL (ref 5–40)
WBC # BLD AUTO: 3.5 X10E3/UL (ref 3.4–10.8)

## 2020-12-11 DIAGNOSIS — G47.00 INSOMNIA, UNSPECIFIED TYPE: Primary | ICD-10-CM

## 2020-12-11 RX ORDER — TRAZODONE HYDROCHLORIDE 50 MG/1
50 TABLET ORAL
Qty: 90 TAB | Refills: 0 | Status: SHIPPED | OUTPATIENT
Start: 2020-12-11 | End: 2021-05-07

## 2020-12-22 DIAGNOSIS — I10 ESSENTIAL (PRIMARY) HYPERTENSION: ICD-10-CM

## 2020-12-23 RX ORDER — LISINOPRIL 40 MG/1
TABLET ORAL
Qty: 90 TAB | Refills: 1 | Status: SHIPPED | OUTPATIENT
Start: 2020-12-23 | End: 2021-08-10 | Stop reason: SDUPTHER

## 2021-01-06 DIAGNOSIS — E53.8 B12 DEFICIENCY: ICD-10-CM

## 2021-01-07 RX ORDER — LANOLIN ALCOHOL/MO/W.PET/CERES
CREAM (GRAM) TOPICAL
Qty: 90 TAB | Refills: 0 | Status: SHIPPED | OUTPATIENT
Start: 2021-01-07 | End: 2021-08-10 | Stop reason: SDUPTHER

## 2021-01-29 DIAGNOSIS — E55.9 VITAMIN D DEFICIENCY: ICD-10-CM

## 2021-02-02 RX ORDER — ERGOCALCIFEROL 1.25 MG/1
CAPSULE ORAL
Qty: 12 CAP | Refills: 0 | Status: SHIPPED | OUTPATIENT
Start: 2021-02-02 | End: 2021-04-28 | Stop reason: SDUPTHER

## 2021-02-05 ENCOUNTER — OFFICE VISIT (OUTPATIENT)
Dept: FAMILY MEDICINE CLINIC | Age: 46
End: 2021-02-05
Payer: MEDICAID

## 2021-02-05 VITALS
HEART RATE: 67 BPM | RESPIRATION RATE: 16 BRPM | DIASTOLIC BLOOD PRESSURE: 71 MMHG | TEMPERATURE: 98.6 F | HEIGHT: 69 IN | SYSTOLIC BLOOD PRESSURE: 138 MMHG | WEIGHT: 260 LBS | BODY MASS INDEX: 38.51 KG/M2 | OXYGEN SATURATION: 97 %

## 2021-02-05 DIAGNOSIS — I10 ESSENTIAL HYPERTENSION: ICD-10-CM

## 2021-02-05 DIAGNOSIS — K21.9 GASTROESOPHAGEAL REFLUX DISEASE WITHOUT ESOPHAGITIS: ICD-10-CM

## 2021-02-05 DIAGNOSIS — E03.9 ACQUIRED HYPOTHYROIDISM: Primary | ICD-10-CM

## 2021-02-05 PROCEDURE — 99214 OFFICE O/P EST MOD 30 MIN: CPT | Performed by: NURSE PRACTITIONER

## 2021-02-05 RX ORDER — ESOMEPRAZOLE MAGNESIUM 40 MG/1
40 FOR SUSPENSION ORAL DAILY
Qty: 90 PACKET | Refills: 1 | Status: SHIPPED | OUTPATIENT
Start: 2021-02-05 | End: 2021-08-10 | Stop reason: SDUPTHER

## 2021-02-05 RX ORDER — LEVOTHYROXINE SODIUM 137 UG/1
137 TABLET ORAL
Qty: 90 TAB | Refills: 0 | Status: SHIPPED | OUTPATIENT
Start: 2021-02-05 | End: 2021-05-07

## 2021-02-05 NOTE — PROGRESS NOTES
Adonis Wood (: 1975) is a 41 y.o. male, established patient, here for evaluation of the following chief complaint(s):  Follow-up (Return in about 3 months (around 2021) for thyroid, htn, post kidney post covid. [facility] ), Abdominal Pain (Seems to be getting worse), Thyroid Problem, Hypertension, Chronic Kidney Disease (post kidney post covid), Concern For COVID-19 (Coronavirus), and Heartburn (Needs his protonix refilled and a PA for it.  )       ASSESSMENT/PLAN:  1. Acquired hypothyroidism  -     levothyroxine (SYNTHROID) 137 mcg tablet; Take 137 mcg by mouth Daily (before breakfast). Indications: a condition with low thyroid hormone levels, Normal, Disp-90 Tab, R-0  -     THYROID CASCADE PROFILE    2. Gastroesophageal reflux disease without esophagitis  -     esomeprazole (NEXIUM) 40 mg granules for oral suspension; Take 40 mg by mouth daily. Indications: gastroesophageal reflux disease, Normal, Disp-90 Packet, R-1    3. Essential hypertension        Return in about 3 months (around 2021) for hypothyroid new levo 137mcg, GERD new nexium. SUBJECTIVE/OBJECTIVE:  HPI  Patient presenting for hypertension followup, medication refill. Hypothyroidism follow-up. Patient reports he did get his labs done. Patient reports blood pressures at home most frequently normal. Patient has symptoms of none of the following; no chest pain, shortness of breath, weakness, orthostatic hypotension, cough, myalgias, rash, headaches, weight gain, leg swelling, palpitations, slow heart rate, fatigue, depression. Patient reports good compliance with medications, no side effects from medications noted. Current treatments include diet modification, weight loss. Patient presenting for evaluation of abdominal pain, acid reflux. Reports previously on pantoprazole however stopped whenever he went to the hospital with Covid and was started on famotidine and instead and problems have worsened.   Has a chronic history of gastritis and GERD since he was a kid and has been taking medications for it his whole life. Patient describes a intermittent, burning pain. Radiation of pain is not present. Pain is moderate, 7/10 in severity. Patient has associated symptoms of belching. Patient does not have symptoms of nausea, vomiting, diarrhea, shortness of breath, diaphoresis, lightheadedness. Aggravating symptoms include eating, deep breaths, lying flat. Treatment prior to arrival includes acid reflux medications, OTC Medications, without improvement. Patient has history of no prior abdominal surgeries. Review of Systems  All other systems reviewed and are negative, vital signs reviewed  Visit Vitals  /71 (BP 1 Location: Left upper arm, BP Patient Position: Sitting, BP Cuff Size: Adult long)   Pulse 67   Temp 98.6 °F (37 °C) (Temporal)   Resp 16   Ht 5' 9\" (1.753 m)   Wt 260 lb (117.9 kg)   SpO2 97%   BMI 38.40 kg/m²       Physical Exam  Constitutional:  No acute distress  HEENT:  Head normocephalic and atraumatic. CV:  Regular rate and rhythm. No murmur. Respiratory:  Lungs clear to auscultation bilaterally  Extremities:  Non-tender. No pedal edema. Back:  No tenderness  Neuro:  No gross motor deficits  Skin:  Normal color. Warm and Dry  Abdomen: soft, nontender, bowel sounds normal      An electronic signature was used to authenticate this note.   -- Mando Melendrez NP

## 2021-02-23 ENCOUNTER — TELEPHONE (OUTPATIENT)
Dept: FAMILY MEDICINE CLINIC | Age: 46
End: 2021-02-23

## 2021-02-23 DIAGNOSIS — G62.9 NEUROPATHY: Primary | ICD-10-CM

## 2021-02-23 NOTE — TELEPHONE ENCOUNTER
Patient called and LVM stating that he needs a refill on his gabapentin. He stated that he tried to do it on the portal but was not able to.

## 2021-02-25 NOTE — TELEPHONE ENCOUNTER
He had taken this previously for nerve pain. He has pain in the right hand, wrist, and elbow and for RLS which is why he was given the script previously. [facility].

## 2021-03-01 RX ORDER — GABAPENTIN 300 MG/1
300 CAPSULE ORAL 2 TIMES DAILY
Qty: 60 CAP | Refills: 0 | Status: SHIPPED | OUTPATIENT
Start: 2021-03-01 | End: 2021-08-10 | Stop reason: SDUPTHER

## 2021-04-28 DIAGNOSIS — E55.9 VITAMIN D DEFICIENCY: ICD-10-CM

## 2021-04-28 RX ORDER — ERGOCALCIFEROL 1.25 MG/1
CAPSULE ORAL
Qty: 12 CAP | Refills: 0 | Status: SHIPPED | OUTPATIENT
Start: 2021-04-28 | End: 2021-08-10 | Stop reason: SDUPTHER

## 2021-04-28 NOTE — TELEPHONE ENCOUNTER
Refill request Vitamin D2 1.25 mg (50,000 units). Take one tablet by mouth one time per week.   Disp 12

## 2021-05-03 ENCOUNTER — TELEPHONE (OUTPATIENT)
Dept: FAMILY MEDICINE CLINIC | Age: 46
End: 2021-05-03

## 2021-05-05 DIAGNOSIS — E03.9 ACQUIRED HYPOTHYROIDISM: ICD-10-CM

## 2021-05-05 DIAGNOSIS — G47.00 INSOMNIA, UNSPECIFIED TYPE: ICD-10-CM

## 2021-05-06 NOTE — TELEPHONE ENCOUNTER
Patient called and LVM on nurse's line that he could not make his appointment that day and needed to reschedule. Called and spoke to patient about rescheduling and that appointments have to be made through the . Patient was given to  to schedule.

## 2021-05-07 RX ORDER — LEVOTHYROXINE SODIUM 137 UG/1
TABLET ORAL
Qty: 90 TAB | Refills: 0 | Status: SHIPPED | OUTPATIENT
Start: 2021-05-07 | End: 2021-08-10 | Stop reason: SDUPTHER

## 2021-05-07 RX ORDER — TRAZODONE HYDROCHLORIDE 50 MG/1
TABLET ORAL
Qty: 90 TAB | Refills: 0 | Status: SHIPPED | OUTPATIENT
Start: 2021-05-07 | End: 2021-08-10 | Stop reason: SDUPTHER

## 2021-05-11 ENCOUNTER — OFFICE VISIT (OUTPATIENT)
Dept: FAMILY MEDICINE CLINIC | Age: 46
End: 2021-05-11
Payer: MEDICAID

## 2021-05-11 VITALS
SYSTOLIC BLOOD PRESSURE: 136 MMHG | RESPIRATION RATE: 16 BRPM | HEIGHT: 72 IN | HEART RATE: 62 BPM | OXYGEN SATURATION: 98 % | TEMPERATURE: 97.7 F | WEIGHT: 269 LBS | BODY MASS INDEX: 36.44 KG/M2 | DIASTOLIC BLOOD PRESSURE: 94 MMHG

## 2021-05-11 DIAGNOSIS — E78.5 HYPERLIPIDEMIA, UNSPECIFIED HYPERLIPIDEMIA TYPE: ICD-10-CM

## 2021-05-11 DIAGNOSIS — G47.00 INSOMNIA, UNSPECIFIED TYPE: Primary | ICD-10-CM

## 2021-05-11 DIAGNOSIS — I10 ESSENTIAL (PRIMARY) HYPERTENSION: ICD-10-CM

## 2021-05-11 DIAGNOSIS — Z11.59 ENCOUNTER FOR HEPATITIS C SCREENING TEST FOR LOW RISK PATIENT: ICD-10-CM

## 2021-05-11 DIAGNOSIS — E03.9 ACQUIRED HYPOTHYROIDISM: ICD-10-CM

## 2021-05-11 PROCEDURE — 99214 OFFICE O/P EST MOD 30 MIN: CPT | Performed by: NURSE PRACTITIONER

## 2021-05-11 RX ORDER — TAMSULOSIN HYDROCHLORIDE 0.4 MG/1
1 CAPSULE ORAL DAILY
COMMUNITY
Start: 2021-05-04 | End: 2021-08-10 | Stop reason: SDUPTHER

## 2021-05-11 NOTE — PROGRESS NOTES
Chief Complaint   Patient presents with    Follow-up     Hypothyroid new levo 137 mcg GERD new nexium    Thyroid Problem     New levothyroxine 137 mcg    GERD     new nexium     Chronic Kidney Disease     Was seen and had labs last week     1. Have you been to the ER, urgent care clinic since your last visit? Hospitalized since your last visit? No    2. Have you seen or consulted any other health care providers outside of the [address] [place] [address] since your last visit? Include any pap smears or colon screening.  Yes Reason for visit: [facility] last week     Visit Vitals  BP (!) 136/94 (BP 1 Location: Left arm, BP Patient Position: Sitting, BP Cuff Size: Adult)   Pulse 62   Temp 97.7 °F (36.5 °C) (Temporal)   Resp 16   Ht 6' (1.829 m)   Wt 269 lb (122 kg)   SpO2 98%   BMI 36.48 kg/m²

## 2021-05-11 NOTE — PROGRESS NOTES
Ashlie Figueroa. (: 1975) is a 41 y.o. male, established patient, here for evaluation of the following chief complaint(s):  Follow-up, Thyroid Problem, GERD, and Chronic Kidney Disease       ASSESSMENT/PLAN:  Below is the assessment and plan developed based on review of pertinent history, physical exam, labs, studies, and medications. 1. Insomnia, unspecified type  2. Essential (primary) hypertension  -     CBC WITH AUTOMATED DIFF  -     METABOLIC PANEL, COMPREHENSIVE  -     LIPID PANEL  -     THYROID CASCADE PROFILE  -     MICROALBUMIN, UR, RAND W/ MICROALB/CREAT RATIO  3. Hyperlipidemia, unspecified hyperlipidemia type  -     CBC WITH AUTOMATED DIFF  -     METABOLIC PANEL, COMPREHENSIVE  -     LIPID PANEL  -     THYROID CASCADE PROFILE  -     MICROALBUMIN, UR, RAND W/ MICROALB/CREAT RATIO  4. Acquired hypothyroidism  -     CBC WITH AUTOMATED DIFF  -     METABOLIC PANEL, COMPREHENSIVE  -     LIPID PANEL  -     THYROID CASCADE PROFILE  -     MICROALBUMIN, UR, RAND W/ MICROALB/CREAT RATIO  5. Encounter for hepatitis C screening test for low risk patient  -     HEPATITIS C QT BY PCR WITH REFLEX GENOTYPE      Return in about 3 months (around 2021) for Hyperlipidemia, HTN, Hypothyroid, Lab review. SUBJECTIVE/OBJECTIVE:  HPI  Patient presenting for hypertension followup, hyperlipidemia check. Follow-up (Hypothyroid new levo 137 mcg GERD new nexium which is controlling his symptoms really well), Thyroid Problem (New levothyroxine 137 mcg), GERD (new nexium ), and Chronic Kidney Disease (Was seen and had labs last week) Patient reports blood pressures at home most frequently normal. Patient has symptoms of none of the following; no chest pain, shortness of breath, weakness, orthostatic hypotension, cough, myalgias, rash, headaches, weight gain, leg swelling, palpitations, slow heart rate, fatigue, depression. Patient reports good compliance with medications, no side effects from medications noted. Current treatments include diet modification, weight loss. Review of Systems   All other systems reviewed and are negative. Visit Vitals  BP (!) 136/94 (BP 1 Location: Left arm, BP Patient Position: Sitting, BP Cuff Size: Adult)   Pulse 62   Temp 97.7 °F (36.5 °C) (Temporal)   Resp 16   Ht 6' (1.829 m)   Wt 269 lb (122 kg)   SpO2 98%   BMI 36.48 kg/m²         Physical Exam  Vitals signs and nursing note reviewed. Exam conducted with a chaperone present. Constitutional:  No acute distress  HEENT:  Head normocephalic and atraumatic. CV:  Regular rate and rhythm. No murmur. Respiratory:  Lungs clear to auscultation bilaterally  Abdomen:  Soft, non-tender. Skin:  Normal color. Warm and Dry  Extremities:  Non-tender. No pedal edema. Back:  No tenderness  Neuro:  No gross motor deficits          On this date 05/11/2021 I have spent 31 minutes reviewing previous notes, test results and face to face with the patient discussing the diagnosis and importance of compliance with the treatment plan as well as documenting on the day of the visit. An electronic signature was used to authenticate this note.   -- Mika Spencer NP

## 2021-05-13 DIAGNOSIS — K21.9 GASTROESOPHAGEAL REFLUX DISEASE WITHOUT ESOPHAGITIS: ICD-10-CM

## 2021-05-13 LAB
ALBUMIN SERPL-MCNC: 4.4 G/DL (ref 4–5)
ALBUMIN/CREAT UR: 43 MG/G CREAT (ref 0–29)
ALBUMIN/GLOB SERPL: 1.8 {RATIO} (ref 1.2–2.2)
ALP SERPL-CCNC: 83 IU/L (ref 39–117)
ALT SERPL-CCNC: 16 IU/L (ref 0–44)
AST SERPL-CCNC: 19 IU/L (ref 0–40)
BASOPHILS # BLD AUTO: 0 X10E3/UL (ref 0–0.2)
BASOPHILS NFR BLD AUTO: 1 %
BILIRUB SERPL-MCNC: 0.5 MG/DL (ref 0–1.2)
BUN SERPL-MCNC: 14 MG/DL (ref 6–24)
BUN/CREAT SERPL: 9 (ref 9–20)
CALCIUM SERPL-MCNC: 9.6 MG/DL (ref 8.7–10.2)
CHLORIDE SERPL-SCNC: 103 MMOL/L (ref 96–106)
CHOLEST SERPL-MCNC: 160 MG/DL (ref 100–199)
CO2 SERPL-SCNC: 29 MMOL/L (ref 20–29)
CREAT SERPL-MCNC: 1.53 MG/DL (ref 0.76–1.27)
CREAT UR-MCNC: 233.5 MG/DL
EOSINOPHIL # BLD AUTO: 0.2 X10E3/UL (ref 0–0.4)
EOSINOPHIL NFR BLD AUTO: 5 %
ERYTHROCYTE [DISTWIDTH] IN BLOOD BY AUTOMATED COUNT: 13.3 % (ref 11.6–15.4)
GLOBULIN SER CALC-MCNC: 2.4 G/DL (ref 1.5–4.5)
GLUCOSE SERPL-MCNC: 105 MG/DL (ref 65–99)
HCT VFR BLD AUTO: 46.9 % (ref 37.5–51)
HCV GENOTYPE: NORMAL
HCV RNA SERPL NAA+PROBE-ACNC: NORMAL IU/ML
HCV RNA SERPL NAA+PROBE-LOG IU: NORMAL LOG10 IU/ML
HDLC SERPL-MCNC: 42 MG/DL
HGB BLD-MCNC: 14.9 G/DL (ref 13–17.7)
IMM GRANULOCYTES # BLD AUTO: 0 X10E3/UL (ref 0–0.1)
IMM GRANULOCYTES NFR BLD AUTO: 0 %
INTERPRETIVE COMMENT, 330017: ABNORMAL
LDLC SERPL CALC-MCNC: 101 MG/DL (ref 0–99)
LYMPHOCYTES # BLD AUTO: 1.2 X10E3/UL (ref 0.7–3.1)
LYMPHOCYTES NFR BLD AUTO: 34 %
MCH RBC QN AUTO: 29.3 PG (ref 26.6–33)
MCHC RBC AUTO-ENTMCNC: 31.8 G/DL (ref 31.5–35.7)
MCV RBC AUTO: 92 FL (ref 79–97)
MICROALBUMIN UR-MCNC: 101.1 UG/ML
MONOCYTES # BLD AUTO: 0.2 X10E3/UL (ref 0.1–0.9)
MONOCYTES NFR BLD AUTO: 7 %
NEUTROPHILS # BLD AUTO: 1.9 X10E3/UL (ref 1.4–7)
NEUTROPHILS NFR BLD AUTO: 53 %
PLATELET # BLD AUTO: 213 X10E3/UL (ref 150–450)
POTASSIUM SERPL-SCNC: 4.3 MMOL/L (ref 3.5–5.2)
PROT SERPL-MCNC: 6.8 G/DL (ref 6–8.5)
RBC # BLD AUTO: 5.08 X10E6/UL (ref 4.14–5.8)
SODIUM SERPL-SCNC: 143 MMOL/L (ref 134–144)
T4 FREE SERPL-MCNC: 0.82 NG/DL (ref 0.82–1.77)
TEST INFORMATION: NORMAL
THYROPEROXIDASE AB SERPL-ACNC: 367 IU/ML (ref 0–34)
TRIGL SERPL-MCNC: 90 MG/DL (ref 0–149)
TSH SERPL DL<=0.005 MIU/L-ACNC: 17.5 UIU/ML (ref 0.45–4.5)
VLDLC SERPL CALC-MCNC: 17 MG/DL (ref 5–40)
WBC # BLD AUTO: 3.6 X10E3/UL (ref 3.4–10.8)

## 2021-05-15 RX ORDER — FAMOTIDINE 20 MG/1
TABLET, FILM COATED ORAL
Qty: 90 TAB | Refills: 1 | Status: SHIPPED | OUTPATIENT
Start: 2021-05-15 | End: 2021-08-10 | Stop reason: SDUPTHER

## 2021-05-15 NOTE — PROGRESS NOTES
His TSH was elevated however at the time of the appt he stated that he had not taken his med for a bit.  We will continue with current dose, and re-check prior to his visit in August

## 2021-05-17 NOTE — PROGRESS NOTES
Called and spoke to patient who stated he had not had any medication for at least 5 days. He voiced understanding of the plan of care.

## 2021-06-14 ENCOUNTER — TELEPHONE (OUTPATIENT)
Dept: FAMILY MEDICINE CLINIC | Age: 46
End: 2021-06-14

## 2021-06-14 NOTE — TELEPHONE ENCOUNTER
Patient has a place on the back of his thigh for about 3 weeks that he thinks is a spider bite that is about the size of a quarter and hard. Does not appear to draining. Discussed signs of infection and when to seek immediate care. Patient will wilber to schedule an appointment.

## 2021-08-06 DIAGNOSIS — I10 ESSENTIAL (PRIMARY) HYPERTENSION: ICD-10-CM

## 2021-08-06 NOTE — TELEPHONE ENCOUNTER
Patient called stating the pharmacy had requested his medication on [facility] and he has been out for days of his [facility]opril [facility]

## 2021-08-10 ENCOUNTER — OFFICE VISIT (OUTPATIENT)
Dept: FAMILY MEDICINE CLINIC | Age: 46
End: 2021-08-10
Payer: MEDICAID

## 2021-08-10 VITALS
BODY MASS INDEX: 36.16 KG/M2 | DIASTOLIC BLOOD PRESSURE: 108 MMHG | HEART RATE: 64 BPM | SYSTOLIC BLOOD PRESSURE: 160 MMHG | RESPIRATION RATE: 18 BRPM | HEIGHT: 72 IN | WEIGHT: 267 LBS | TEMPERATURE: 97.1 F | OXYGEN SATURATION: 98 %

## 2021-08-10 DIAGNOSIS — E53.8 B12 DEFICIENCY: ICD-10-CM

## 2021-08-10 DIAGNOSIS — K21.9 GASTROESOPHAGEAL REFLUX DISEASE WITHOUT ESOPHAGITIS: ICD-10-CM

## 2021-08-10 DIAGNOSIS — G62.9 NEUROPATHY: ICD-10-CM

## 2021-08-10 DIAGNOSIS — E55.9 VITAMIN D DEFICIENCY: ICD-10-CM

## 2021-08-10 DIAGNOSIS — E03.9 ACQUIRED HYPOTHYROIDISM: ICD-10-CM

## 2021-08-10 DIAGNOSIS — I10 ESSENTIAL (PRIMARY) HYPERTENSION: ICD-10-CM

## 2021-08-10 DIAGNOSIS — G47.00 INSOMNIA, UNSPECIFIED TYPE: ICD-10-CM

## 2021-08-10 PROCEDURE — 99214 OFFICE O/P EST MOD 30 MIN: CPT | Performed by: NURSE PRACTITIONER

## 2021-08-10 RX ORDER — TAMSULOSIN HYDROCHLORIDE 0.4 MG/1
0.4 CAPSULE ORAL DAILY
Qty: 30 CAPSULE | Refills: 11 | Status: SHIPPED | OUTPATIENT
Start: 2021-08-10 | End: 2021-10-05 | Stop reason: ALTCHOICE

## 2021-08-10 RX ORDER — LEVOTHYROXINE SODIUM 137 UG/1
TABLET ORAL
Qty: 90 TABLET | Refills: 0 | Status: SHIPPED | OUTPATIENT
Start: 2021-08-10 | End: 2021-10-05 | Stop reason: SDUPTHER

## 2021-08-10 RX ORDER — LISINOPRIL 40 MG/1
TABLET ORAL
Qty: 90 TABLET | Refills: 1 | Status: SHIPPED | OUTPATIENT
Start: 2021-08-10 | End: 2021-10-05 | Stop reason: SDUPTHER

## 2021-08-10 RX ORDER — ESOMEPRAZOLE MAGNESIUM 40 MG/1
40 FOR SUSPENSION ORAL DAILY
Qty: 90 PACKET | Refills: 1 | Status: SHIPPED | OUTPATIENT
Start: 2021-08-10 | End: 2022-02-01

## 2021-08-10 RX ORDER — ERGOCALCIFEROL 1.25 MG/1
CAPSULE ORAL
Qty: 12 CAPSULE | Refills: 0 | Status: SHIPPED | OUTPATIENT
Start: 2021-08-10 | End: 2021-11-16

## 2021-08-10 RX ORDER — FAMOTIDINE 20 MG/1
TABLET, FILM COATED ORAL
Qty: 90 TABLET | Refills: 1 | Status: SHIPPED | OUTPATIENT
Start: 2021-08-10 | End: 2022-03-05

## 2021-08-10 RX ORDER — TRAZODONE HYDROCHLORIDE 50 MG/1
TABLET ORAL
Qty: 90 TABLET | Refills: 1 | Status: SHIPPED | OUTPATIENT
Start: 2021-08-10 | End: 2021-12-10

## 2021-08-10 RX ORDER — GABAPENTIN 300 MG/1
300 CAPSULE ORAL 2 TIMES DAILY
Qty: 60 CAPSULE | Refills: 0 | Status: SHIPPED | OUTPATIENT
Start: 2021-08-10 | End: 2022-05-03 | Stop reason: ALTCHOICE

## 2021-08-10 RX ORDER — LANOLIN ALCOHOL/MO/W.PET/CERES
CREAM (GRAM) TOPICAL
Qty: 90 TABLET | Refills: 1 | Status: SHIPPED | OUTPATIENT
Start: 2021-08-10

## 2021-08-10 NOTE — PROGRESS NOTES
Narendra Mcguire (: 1975) is a 40 y.o. male, established patient, here for evaluation of the following chief complaint(s):  Follow-up, Cholesterol Problem, Hypertension, Thyroid Problem, Labs, and Medication Refill    ASSESSMENT/PLAN:  Below is the assessment and plan developed based on review of pertinent history, physical exam, labs, studies, and medications. 1. Insomnia, unspecified type  -     traZODone (DESYREL) 50 mg tablet; TAKE 1 TABLET BY MOUTH EVERY DAY AT NIGHT, Normal, Disp-90 Tablet, R-1  2. Essential (primary) hypertension  -     lisinopriL (PRINIVIL, ZESTRIL) 40 mg tablet; TAKE 1 TABLET BY MOUTH EVERY DAY, Normal, Disp-90 Tablet, R-1  -     CBC WITH AUTOMATED DIFF  -     METABOLIC PANEL, COMPREHENSIVE  -     LIPID PANEL  -     THYROID CASCADE PROFILE  -     MICROALBUMIN, UR, RAND W/ MICROALB/CREAT RATIO  3. Acquired hypothyroidism  -     levothyroxine (SYNTHROID) 137 mcg tablet; TAKE 1 TABLET BY MOUTH ONCE DAILY BEFORE BREAKFAST FOR LOW THYROID HORMONE, Normal, Disp-90 Tablet, R-0  -     THYROID CASCADE PROFILE  -     MICROALBUMIN, UR, RAND W/ MICROALB/CREAT RATIO  4. Neuropathy  -     gabapentin (NEURONTIN) 300 mg capsule; Take 1 Capsule by mouth two (2) times a day. Max Daily Amount: 600 mg. Indications: neuropathic pain, restless legs syndrome, an extreme discomfort in the calf muscles when sitting or lying down, Normal, Disp-60 Capsule, R-0  5. Gastroesophageal reflux disease without esophagitis  -     famotidine (PEPCID) 20 mg tablet; TAKE 1 TABLET BY MOUTH EVERY DAY AT NIGHT, Normal, Disp-90 Tablet, R-1  -     esomeprazole (NEXIUM) 40 mg granules for oral suspension; Take 40 mg by mouth daily. Indications: gastroesophageal reflux disease, Normal, Disp-90 Packet, R-1  6.  Vitamin D deficiency  -     ergocalciferol (ERGOCALCIFEROL) 1,250 mcg (50,000 unit) capsule; TAKE 1 CAPSULE BY MOUTH ONE TIME PER WEEK  Indications: vitamin D deficiency (high dose therapy), Normal, Disp-12 Capsule, R-0  7. B12 deficiency  -     cyanocobalamin (VITAMIN B12) 500 mcg tablet; TAKE 1 TABLET BY MOUTH ONCE DAILY, Normal, Disp-90 Tablet, R-1    This time recommended to do elimination diet    Look on the Internet for PDF on elimination diet for gluten and dairy    Journal the lamination diet to help evaluate for celiac disease    Return in about 4 weeks (around 9/7/2021) for HTN, restarted BP meds, celiac disease, gluten elimination diet, Annual wellness. SUBJECTIVE/OBJECTIVE:  HPI  Follow-up (Hyperlipidemia, HTN, Hypothyroid, Lab review), Cholesterol Problem, Hypertension (Has been out of his medication for over a week. His son leaves for college tomorrow  ), Thyroid Problem, Labs, and Medication Refill. Concern with celiact disease with abdominal pain and frequent diarrhea. Loves poptarts, cereal and donuts on the regular basis. Patient reports blood pressures at home most frequently normal. Patient has symptoms of none of the following; no chest pain, shortness of breath, weakness, orthostatic hypotension, cough, myalgias, rash, headaches, weight gain, leg swelling, palpitations, slow heart rate, fatigue, depression. Patient reports good compliance with medications, no side effects from medications noted. Current treatments include diet modification, weight loss. Review of Systems  All other systems reviewed and are negative. Visit Vitals  BP (!) 160/108 (BP 1 Location: Left arm, BP Patient Position: Sitting, BP Cuff Size: Adult)   Pulse 64   Temp 97.1 °F (36.2 °C) (Temporal)   Resp 18   Ht 6' (1.829 m)   Wt 267 lb (121.1 kg)   SpO2 98%   BMI 36.21 kg/m²       Physical Exam  Constitutional:  No acute distress  HEENT:  Head normocephalic and atraumatic. CV:  Regular rate and rhythm. No murmur. Respiratory:  Lungs clear to auscultation bilaterally  Abdomen:  Soft, non-tender. Skin:  Normal color. Warm and Dry  Extremities:  Non-tender. No pedal edema.    Back:  No tenderness  Neuro:  No gross motor deficits    On this date 08/10/2021 I have spent 31 minutes reviewing previous notes, test results and face to face with the patient discussing the diagnosis and importance of compliance with the treatment plan as well as documenting on the day of the visit. Aspects of this note may have been generated using voice recognition software. Despite editing, there may be some syntax errors. An electronic signature was used to authenticate this note.   -- Shakir Waller NP

## 2021-08-10 NOTE — PATIENT INSTRUCTIONS
Look this website for PDF on elimination diet for gluten and dairy    [email or web address]). pdf

## 2021-08-10 NOTE — PROGRESS NOTES
Chief Complaint   Patient presents with    Follow-up     Hyperlipidemia, HTN, Hypothyroid, Lab review    Cholesterol Problem    Hypertension     Has been out of his medication for over a week. His son leaves for college tomorrow      Thyroid Problem    Labs    Medication Refill     1. Have you been to the ER, urgent care clinic since your last visit? Hospitalized since your last visit? No    2. Have you seen or consulted any other health care providers outside of the [address] [place] [address] since your last visit? Include any pap smears or colon screening. No    Visit Vitals  BP (!) 150/105 (BP 1 Location: Left arm, BP Patient Position: Sitting, BP Cuff Size: Adult)   Pulse 63   Temp 97.1 °F (36.2 °C) (Temporal)   Resp 18   Ht 6' (1.829 m)   Wt 267 lb (121.1 kg)   SpO2 98%   BMI 36.21 kg/m²     Visit Vitals  BP (!) 151/102 (BP 1 Location: Left arm, BP Patient Position: Sitting, BP Cuff Size: Adult)   Pulse 68   Temp 97.1 °F (36.2 °C) (Temporal)   Resp 18   Ht 6' (1.829 m)   Wt 267 lb (121.1 kg)   SpO2 98%   BMI 36.21 kg/m²     Visit Vitals  BP (!) 160/108 (BP 1 Location: Left arm, BP Patient Position: Sitting, BP Cuff Size: Adult)   Pulse 64   Temp 97.1 °F (36.2 °C) (Temporal)   Resp 18   Ht 6' (1.829 m)   Wt 267 lb (121.1 kg)   SpO2 98%   BMI 36.21 kg/m²       Patient has been without his BP medication x 1 week. He denies any headaches or dizziness today.

## 2021-08-11 LAB
ALBUMIN SERPL-MCNC: 4.5 G/DL (ref 4–5)
ALBUMIN/CREAT UR: 34 MG/G CREAT (ref 0–29)
ALBUMIN/GLOB SERPL: 1.8 {RATIO} (ref 1.2–2.2)
ALP SERPL-CCNC: 81 IU/L (ref 48–121)
ALT SERPL-CCNC: 18 IU/L (ref 0–44)
AST SERPL-CCNC: 18 IU/L (ref 0–40)
BASOPHILS # BLD AUTO: 0.1 X10E3/UL (ref 0–0.2)
BASOPHILS NFR BLD AUTO: 2 %
BILIRUB SERPL-MCNC: 0.5 MG/DL (ref 0–1.2)
BUN SERPL-MCNC: 14 MG/DL (ref 6–24)
BUN/CREAT SERPL: 9 (ref 9–20)
CALCIUM SERPL-MCNC: 9.4 MG/DL (ref 8.7–10.2)
CHLORIDE SERPL-SCNC: 104 MMOL/L (ref 96–106)
CHOLEST SERPL-MCNC: 163 MG/DL (ref 100–199)
CO2 SERPL-SCNC: 26 MMOL/L (ref 20–29)
CREAT SERPL-MCNC: 1.56 MG/DL (ref 0.76–1.27)
CREAT UR-MCNC: 240.9 MG/DL
EOSINOPHIL # BLD AUTO: 0.1 X10E3/UL (ref 0–0.4)
EOSINOPHIL NFR BLD AUTO: 3 %
ERYTHROCYTE [DISTWIDTH] IN BLOOD BY AUTOMATED COUNT: 12.8 % (ref 11.6–15.4)
GLOBULIN SER CALC-MCNC: 2.5 G/DL (ref 1.5–4.5)
GLUCOSE SERPL-MCNC: 104 MG/DL (ref 65–99)
HCT VFR BLD AUTO: 46.7 % (ref 37.5–51)
HDLC SERPL-MCNC: 42 MG/DL
HGB BLD-MCNC: 15.1 G/DL (ref 13–17.7)
IMM GRANULOCYTES # BLD AUTO: 0 X10E3/UL (ref 0–0.1)
IMM GRANULOCYTES NFR BLD AUTO: 0 %
LDLC SERPL CALC-MCNC: 105 MG/DL (ref 0–99)
LYMPHOCYTES # BLD AUTO: 1.3 X10E3/UL (ref 0.7–3.1)
LYMPHOCYTES NFR BLD AUTO: 33 %
MCH RBC QN AUTO: 29.3 PG (ref 26.6–33)
MCHC RBC AUTO-ENTMCNC: 32.3 G/DL (ref 31.5–35.7)
MCV RBC AUTO: 91 FL (ref 79–97)
MICROALBUMIN UR-MCNC: 82 UG/ML
MONOCYTES # BLD AUTO: 0.3 X10E3/UL (ref 0.1–0.9)
MONOCYTES NFR BLD AUTO: 8 %
NEUTROPHILS # BLD AUTO: 2.2 X10E3/UL (ref 1.4–7)
NEUTROPHILS NFR BLD AUTO: 54 %
PLATELET # BLD AUTO: 236 X10E3/UL (ref 150–450)
POTASSIUM SERPL-SCNC: 4.3 MMOL/L (ref 3.5–5.2)
PROT SERPL-MCNC: 7 G/DL (ref 6–8.5)
RBC # BLD AUTO: 5.15 X10E6/UL (ref 4.14–5.8)
SODIUM SERPL-SCNC: 144 MMOL/L (ref 134–144)
TRIGL SERPL-MCNC: 83 MG/DL (ref 0–149)
TSH SERPL DL<=0.005 MIU/L-ACNC: 2.96 UIU/ML (ref 0.45–4.5)
VLDLC SERPL CALC-MCNC: 16 MG/DL (ref 5–40)
WBC # BLD AUTO: 4 X10E3/UL (ref 3.4–10.8)

## 2021-08-11 RX ORDER — LISINOPRIL 40 MG/1
TABLET ORAL
Qty: 90 TABLET | Refills: 1 | Status: SHIPPED | OUTPATIENT
Start: 2021-08-11 | End: 2021-11-09 | Stop reason: SDUPTHER

## 2021-09-17 DIAGNOSIS — Z20.2 TRICHOMONAS EXPOSURE: Primary | ICD-10-CM

## 2021-09-17 RX ORDER — METRONIDAZOLE 500 MG/1
500 TABLET ORAL 3 TIMES DAILY
Qty: 30 TABLET | Refills: 0 | Status: SHIPPED | OUTPATIENT
Start: 2021-09-17 | End: 2021-09-27

## 2021-10-05 ENCOUNTER — OFFICE VISIT (OUTPATIENT)
Dept: FAMILY MEDICINE CLINIC | Age: 46
End: 2021-10-05
Payer: MEDICAID

## 2021-10-05 VITALS
BODY MASS INDEX: 35.49 KG/M2 | HEART RATE: 62 BPM | HEIGHT: 72 IN | SYSTOLIC BLOOD PRESSURE: 164 MMHG | WEIGHT: 262 LBS | OXYGEN SATURATION: 98 % | DIASTOLIC BLOOD PRESSURE: 102 MMHG | TEMPERATURE: 98.2 F

## 2021-10-05 DIAGNOSIS — R33.9 URINE RETENTION: ICD-10-CM

## 2021-10-05 DIAGNOSIS — I10 ESSENTIAL HYPERTENSION: ICD-10-CM

## 2021-10-05 DIAGNOSIS — Z00.00 ANNUAL PHYSICAL EXAM: Primary | ICD-10-CM

## 2021-10-05 DIAGNOSIS — E03.9 ACQUIRED HYPOTHYROIDISM: ICD-10-CM

## 2021-10-05 DIAGNOSIS — N40.1 BENIGN PROSTATIC HYPERPLASIA WITH LOWER URINARY TRACT SYMPTOMS, SYMPTOM DETAILS UNSPECIFIED: ICD-10-CM

## 2021-10-05 PROCEDURE — 99396 PREV VISIT EST AGE 40-64: CPT | Performed by: NURSE PRACTITIONER

## 2021-10-05 RX ORDER — LEVOTHYROXINE SODIUM 137 UG/1
TABLET ORAL
Qty: 90 TABLET | Refills: 1 | Status: SHIPPED | OUTPATIENT
Start: 2021-10-05 | End: 2022-04-22

## 2021-10-05 RX ORDER — AMLODIPINE BESYLATE 5 MG/1
5 TABLET ORAL DAILY
Qty: 90 TABLET | Refills: 0 | Status: SHIPPED | OUTPATIENT
Start: 2021-10-05 | End: 2022-02-01

## 2021-10-05 RX ORDER — TAMSULOSIN HYDROCHLORIDE 0.4 MG/1
0.4 CAPSULE ORAL DAILY
Qty: 90 CAPSULE | Refills: 1 | Status: SHIPPED | OUTPATIENT
Start: 2021-10-05 | End: 2021-12-08 | Stop reason: ALTCHOICE

## 2021-10-05 NOTE — PROGRESS NOTES
1. Have you been to the ER, urgent care clinic since your last visit? Hospitalized since your last visit? No    2. Have you seen or consulted any other health care providers outside of the [address] [place] [address] since your last visit? Include any pap smears or colon screening.  No    Chief Complaint   Patient presents with    Physical     Visit Vitals  BP (!) 171/102 (BP 1 Location: Left upper arm, BP Patient Position: Sitting)   Pulse 64   Temp 98.2 °F (36.8 °C) (Temporal)   Ht 6' (1.829 m)   Wt 262 lb (118.8 kg)   SpO2 98%   BMI 35.53 kg/m²

## 2021-10-05 NOTE — PROGRESS NOTES
Subjective:   Romie Fleischer. is a 40 y.o. male presenting for his annual checkup. ROS:  Feeling well. No dyspnea or chest pain on exertion. No abdominal pain, change in bowel habits, black or bloody stools. No urinary tract or prostatic symptoms. No neurological complaints. Specific concerns today: None. Patient Active Problem List   Diagnosis Code    Thyroid activity decreased E03.9    Right knee pain M25.561    Atypical chest pain R07.89    Cardiovascular stress test abnormal R94.39    Chronic kidney disease, stage 2 (mild) N18.2    Chronic prostatitis N41.1    Essential hypertension I10    Leukopenia D72.819    Lumbosacral radiculopathy M54.17    Snoring R06.83    Severe obesity (HCC) E66.01     Patient Active Problem List    Diagnosis Date Noted    Severe obesity ([facility] [address] 11/06/2020    Thyroid activity decreased 09/24/2020    Right knee pain 09/24/2020    Atypical chest pain 09/24/2020    Cardiovascular stress test abnormal 09/24/2020    Chronic kidney disease, stage 2 (mild) 09/24/2020    Chronic prostatitis 09/24/2020    Essential hypertension 09/24/2020    Leukopenia 09/24/2020    Lumbosacral radiculopathy 09/24/2020    Snoring 09/24/2020     Current Outpatient Medications   Medication Sig Dispense Refill    amLODIPine (NORVASC) 5 mg tablet Take 1 Tablet by mouth daily. Indications: high blood pressure 90 Tablet 0    levothyroxine (SYNTHROID) 137 mcg tablet TAKE 1 TABLET BY MOUTH ONCE DAILY BEFORE BREAKFAST FOR LOW THYROID HORMONE 90 Tablet 1    tamsulosin (FLOMAX) 0.4 mg capsule Take 1 Capsule by mouth daily for 360 days. 90 Capsule 1    lisinopriL (PRINIVIL, ZESTRIL) 40 mg tablet TAKE 1 TABLET BY MOUTH EVERY DAY 90 Tablet 1    traZODone (DESYREL) 50 mg tablet TAKE 1 TABLET BY MOUTH EVERY DAY AT NIGHT 90 Tablet 1    gabapentin (NEURONTIN) 300 mg capsule Take 1 Capsule by mouth two (2) times a day. Max Daily Amount: 600 mg.  Indications: neuropathic pain, restless legs syndrome, an extreme discomfort in the calf muscles when sitting or lying down 60 Capsule 0    famotidine (PEPCID) 20 mg tablet TAKE 1 TABLET BY MOUTH EVERY DAY AT NIGHT 90 Tablet 1    esomeprazole (NEXIUM) 40 mg granules for oral suspension Take 40 mg by mouth daily.  Indications: gastroesophageal reflux disease 90 Packet 1    ergocalciferol (ERGOCALCIFEROL) 1,250 mcg (50,000 unit) capsule TAKE 1 CAPSULE BY MOUTH ONE TIME PER WEEK  Indications: vitamin D deficiency (high dose therapy) 12 Capsule 0    cyanocobalamin (VITAMIN B12) 500 mcg tablet TAKE 1 TABLET BY MOUTH ONCE DAILY 90 Tablet 1     Allergies   Allergen Reactions    Vicodin [Hydrocodone-Acetaminophen] Other (comments)     hallucinations     Past Medical History:   Diagnosis Date    Atypical chest pain 2020    Cardiovascular stress test abnormal 2020    Chronic kidney disease, stage 2 (mild) 2020    Chronic prostatitis 2020    COVID-19     Dizziness     Essential hypertension 2020    Kidney disease, chronic, stage II (GFR 60-89 ml/min)     Leukopenia 2020    Lumbosacral radiculopathy 2020    Right knee pain 2020    Snoring 2020    Stomach ulcer     Thyroid activity decreased 2020     Past Surgical History:   Procedure Laterality Date    HX ORTHOPAEDIC Right     meniscus repair    HX WISDOM TEETH EXTRACTION       Family History   Problem Relation Age of Onset    Hypertension Mother     Diabetes Father         insulin dependent     Hypertension Father     Stroke Paternal Aunt 80    Cancer Paternal Uncle         liver cancer (heavy drinker)    Cancer Maternal Grandfather         throat cancer      Social History     Tobacco Use    Smoking status: Former Smoker     Quit date:      Years since quittin.7    Smokeless tobacco: Never Used   Substance Use Topics    Alcohol use: Yes     Comment: Last drink Summer 2020        Lab Results   Component Value Date/Time    WBC 4.0 08/10/2021 11:49 AM    HGB 15.1 08/10/2021 11:49 AM    HCT 46.7 08/10/2021 11:49 AM    PLATELET 807 72/47/4323 11:49 AM    MCV 91 08/10/2021 11:49 AM     Lab Results   Component Value Date/Time    Hemoglobin A1c, External A1C 5.9  EAG  123 05/08/2019 12:00 AM    Glucose 104 (H) 08/10/2021 11:49 AM    Microalb/Creat ratio (ug/mg creat.) 34 (H) 08/10/2021 11:49 AM    LDL, calculated 105 (H) 08/10/2021 11:49 AM    Creatinine 1.56 (H) 08/10/2021 11:49 AM      Lab Results   Component Value Date/Time    Cholesterol, total 163 08/10/2021 11:49 AM    HDL Cholesterol 42 08/10/2021 11:49 AM    LDL, calculated 105 (H) 08/10/2021 11:49 AM    Triglyceride 83 08/10/2021 11:49 AM        Objective:     Visit Vitals  BP (!) 164/102 (BP 1 Location: Left upper arm, BP Patient Position: Sitting)   Pulse 62   Temp 98.2 °F (36.8 °C) (Temporal)   Ht 6' (1.829 m)   Wt 262 lb (118.8 kg)   SpO2 98%   BMI 35.53 kg/m²     The patient appears well, alert, oriented x 3, in no distress. ENT normal.  Neck supple. No adenopathy or thyromegaly. LEONORA. Lungs are clear, good air entry, no wheezes, rhonchi or rales. S1 and S2 normal, no murmurs, regular rate and rhythm. Abdomen is soft without tenderness, guarding, mass or organomegaly.  exam: no penile lesions or discharge, no testicular masses or tenderness, no hernias. Extremities show no edema, normal peripheral pulses. Neurological is normal without focal findings. Assessment/Plan:   healthy adult male  Start amlodipine    Follow up with uro for BPH    Follow up in 1 month    increase physical activity, limit alcohol consumption, follow low fat diet, follow low salt diet, continue present plan, routine labs ordered, have labs drawn prior to ROV, call if any problems. ICD-10-CM ICD-9-CM    1. Annual physical exam  Z00.00 V70.0    2. Essential hypertension  I10 401.9 amLODIPine (NORVASC) 5 mg tablet   3. Acquired hypothyroidism  E03.9 244.9 levothyroxine (SYNTHROID) 137 mcg tablet   4. Urine retention  R33.9 788.20 tamsulosin (FLOMAX) 0.4 mg capsule   5. Benign prostatic hyperplasia with lower urinary tract symptoms, symptom details unspecified  N40.1 600.01 tamsulosin (FLOMAX) 0.4 mg capsule      REFERRAL TO UROLOGY     Follow-up and Dispositions    · Return in about 4 weeks (around 11/2/2021) for HTN, post uro BPH. Tran Valladares

## 2021-11-02 ENCOUNTER — OFFICE VISIT (OUTPATIENT)
Dept: FAMILY MEDICINE CLINIC | Age: 46
End: 2021-11-02
Payer: MEDICAID

## 2021-11-02 VITALS
OXYGEN SATURATION: 98 % | HEART RATE: 80 BPM | DIASTOLIC BLOOD PRESSURE: 88 MMHG | WEIGHT: 260.2 LBS | SYSTOLIC BLOOD PRESSURE: 128 MMHG | TEMPERATURE: 98.1 F | RESPIRATION RATE: 18 BRPM | BODY MASS INDEX: 35.29 KG/M2

## 2021-11-02 DIAGNOSIS — N18.2 CHRONIC KIDNEY DISEASE, STAGE 2 (MILD): ICD-10-CM

## 2021-11-02 DIAGNOSIS — Z12.5 PROSTATE CANCER SCREENING: ICD-10-CM

## 2021-11-02 DIAGNOSIS — E03.9 ACQUIRED HYPOTHYROIDISM: ICD-10-CM

## 2021-11-02 DIAGNOSIS — E78.5 HYPERLIPIDEMIA, UNSPECIFIED HYPERLIPIDEMIA TYPE: ICD-10-CM

## 2021-11-02 DIAGNOSIS — I10 ESSENTIAL HYPERTENSION: Primary | ICD-10-CM

## 2021-11-02 PROCEDURE — 99214 OFFICE O/P EST MOD 30 MIN: CPT | Performed by: NURSE PRACTITIONER

## 2021-11-02 NOTE — PROGRESS NOTES
Chief Complaint   Patient presents with    Follow Up Chronic Condition     No concerns or complaints at this time     1. Have you been to the ER, urgent care clinic since your last visit? Hospitalized since your last visit? No    2. Have you seen or consulted any other health care providers outside of the [address] [place] [address] since your last visit? Include any pap smears or colon screening.  No   Visit Vitals  /88 (BP 1 Location: Left upper arm, BP Patient Position: Sitting, BP Cuff Size: Adult)   Pulse 80   Temp 98.1 °F (36.7 °C) (Skin)   Resp 18   Wt 260 lb 3.2 oz (118 kg)   SpO2 98%   BMI 35.29 kg/m²

## 2021-11-02 NOTE — PROGRESS NOTES
Caitie Armijo. (: 1975) is a 40 y.o. male, established patient, here for evaluation of the following chief complaint(s):  Follow Up Chronic Condition (No concerns or complaints at this time)    ASSESSMENT/PLAN:  Below is the assessment and plan developed based on review of pertinent history, physical exam, labs, studies, and medications. 1. Essential hypertension  -     CBC WITH AUTOMATED DIFF  -     METABOLIC PANEL, COMPREHENSIVE  -     LIPID PANEL  -     MICROALBUMIN, UR, RAND W/ MICROALB/CREAT RATIO  2. Acquired hypothyroidism  -     THYROID CASCADE PROFILE  3. Chronic kidney disease, stage 2 (mild)  4. Hyperlipidemia, unspecified hyperlipidemia type  -     CBC WITH AUTOMATED DIFF  -     METABOLIC PANEL, COMPREHENSIVE  -     LIPID PANEL  5. Prostate cancer screening  -     PSA W/ REFLX FREE PSA        Return in about 6 months (around 2022) for HTN, Hyperlipidemia, Lab review, Hypothyroid. SUBJECTIVE/OBJECTIVE:  HPI  Patient presenting for hypertension followup, medication refill, hyperlipidemia check. Patient reports blood pressures at home most frequently normal. Patient has symptoms of none of the following; no chest pain, shortness of breath, weakness, orthostatic hypotension, cough, myalgias, rash, headaches, weight gain, leg swelling, palpitations, slow heart rate, fatigue, depression. Patient reports good compliance with medications, no side effects from medications noted. Current treatments include diet modification, weight loss. Review of Systems  All other systems reviewed and are negative. Visit Vitals  /88 (BP 1 Location: Left upper arm, BP Patient Position: Sitting, BP Cuff Size: Adult)   Pulse 80   Temp 98.1 °F (36.7 °C) (Skin)   Resp 18   Wt 260 lb 3.2 oz (118 kg)   SpO2 98%   BMI 35.29 kg/m²       Physical Exam  Constitutional:  No acute distress  HEENT:  Head normocephalic and atraumatic. CV:  Regular rate and rhythm. No murmur.    Respiratory:  Lungs clear to auscultation bilaterally  Abdomen:  Soft, non-tender. Skin:  Normal color. Warm and Dry  Extremities:  Non-tender. No pedal edema. Back:  No tenderness  Neuro:  No gross motor deficits    On this date 11/02/2021 I have spent 31 minutes reviewing previous notes, test results and face to face with the patient discussing the diagnosis and importance of compliance with the treatment plan as well as documenting on the day of the visit. Aspects of this note may have been generated using voice recognition software. Despite editing, there may be some syntax errors. An electronic signature was used to authenticate this note.   -- Pradip Aden NP

## 2021-11-04 LAB
ALBUMIN SERPL-MCNC: 4.4 G/DL (ref 4–5)
ALBUMIN/CREAT UR: 19 MG/G CREAT (ref 0–29)
ALBUMIN/GLOB SERPL: 1.9 {RATIO} (ref 1.2–2.2)
ALP SERPL-CCNC: 74 IU/L (ref 44–121)
ALT SERPL-CCNC: 18 IU/L (ref 0–44)
AST SERPL-CCNC: 19 IU/L (ref 0–40)
BASOPHILS # BLD AUTO: 0 X10E3/UL (ref 0–0.2)
BASOPHILS NFR BLD AUTO: 1 %
BILIRUB SERPL-MCNC: 0.5 MG/DL (ref 0–1.2)
BUN SERPL-MCNC: 16 MG/DL (ref 6–24)
BUN/CREAT SERPL: 11 (ref 9–20)
CALCIUM SERPL-MCNC: 9.1 MG/DL (ref 8.7–10.2)
CHLORIDE SERPL-SCNC: 102 MMOL/L (ref 96–106)
CHOLEST SERPL-MCNC: 175 MG/DL (ref 100–199)
CO2 SERPL-SCNC: 26 MMOL/L (ref 20–29)
CREAT SERPL-MCNC: 1.49 MG/DL (ref 0.76–1.27)
CREAT UR-MCNC: 295.1 MG/DL
EOSINOPHIL # BLD AUTO: 0.1 X10E3/UL (ref 0–0.4)
EOSINOPHIL NFR BLD AUTO: 2 %
ERYTHROCYTE [DISTWIDTH] IN BLOOD BY AUTOMATED COUNT: 13 % (ref 11.6–15.4)
GLOBULIN SER CALC-MCNC: 2.3 G/DL (ref 1.5–4.5)
GLUCOSE SERPL-MCNC: 101 MG/DL (ref 65–99)
HCT VFR BLD AUTO: 42.8 % (ref 37.5–51)
HDLC SERPL-MCNC: 39 MG/DL
HGB BLD-MCNC: 14.4 G/DL (ref 13–17.7)
IMM GRANULOCYTES # BLD AUTO: 0 X10E3/UL (ref 0–0.1)
IMM GRANULOCYTES NFR BLD AUTO: 0 %
LDLC SERPL CALC-MCNC: 120 MG/DL (ref 0–99)
LYMPHOCYTES # BLD AUTO: 1.1 X10E3/UL (ref 0.7–3.1)
LYMPHOCYTES NFR BLD AUTO: 29 %
MCH RBC QN AUTO: 29.9 PG (ref 26.6–33)
MCHC RBC AUTO-ENTMCNC: 33.6 G/DL (ref 31.5–35.7)
MCV RBC AUTO: 89 FL (ref 79–97)
MICROALBUMIN UR-MCNC: 54.7 UG/ML
MONOCYTES # BLD AUTO: 0.3 X10E3/UL (ref 0.1–0.9)
MONOCYTES NFR BLD AUTO: 7 %
NEUTROPHILS # BLD AUTO: 2.3 X10E3/UL (ref 1.4–7)
NEUTROPHILS NFR BLD AUTO: 61 %
PLATELET # BLD AUTO: 249 X10E3/UL (ref 150–450)
POTASSIUM SERPL-SCNC: 4.2 MMOL/L (ref 3.5–5.2)
PROT SERPL-MCNC: 6.7 G/DL (ref 6–8.5)
PSA SERPL-MCNC: 0.9 NG/ML (ref 0–4)
RBC # BLD AUTO: 4.82 X10E6/UL (ref 4.14–5.8)
REFLEX CRITERIA: NORMAL
SODIUM SERPL-SCNC: 141 MMOL/L (ref 134–144)
TRIGL SERPL-MCNC: 83 MG/DL (ref 0–149)
TSH SERPL DL<=0.005 MIU/L-ACNC: 2.72 UIU/ML (ref 0.45–4.5)
VLDLC SERPL CALC-MCNC: 16 MG/DL (ref 5–40)
WBC # BLD AUTO: 3.7 X10E3/UL (ref 3.4–10.8)

## 2021-11-09 DIAGNOSIS — E55.9 VITAMIN D DEFICIENCY: ICD-10-CM

## 2021-11-09 DIAGNOSIS — I10 ESSENTIAL (PRIMARY) HYPERTENSION: ICD-10-CM

## 2021-11-16 RX ORDER — ERGOCALCIFEROL 1.25 MG/1
CAPSULE ORAL
Qty: 12 CAPSULE | Refills: 0 | Status: SHIPPED | OUTPATIENT
Start: 2021-11-16

## 2021-11-16 RX ORDER — LISINOPRIL 40 MG/1
40 TABLET ORAL DAILY
Qty: 90 TABLET | Refills: 3 | Status: SHIPPED | OUTPATIENT
Start: 2021-11-16

## 2021-12-03 DIAGNOSIS — G47.00 INSOMNIA, UNSPECIFIED TYPE: ICD-10-CM

## 2021-12-08 ENCOUNTER — OFFICE VISIT (OUTPATIENT)
Dept: UROLOGY | Age: 46
End: 2021-12-08
Payer: MEDICAID

## 2021-12-08 VITALS
DIASTOLIC BLOOD PRESSURE: 89 MMHG | WEIGHT: 258 LBS | SYSTOLIC BLOOD PRESSURE: 147 MMHG | OXYGEN SATURATION: 99 % | HEART RATE: 70 BPM | BODY MASS INDEX: 36.12 KG/M2 | HEIGHT: 71 IN

## 2021-12-08 DIAGNOSIS — R33.9 URINE RETENTION: ICD-10-CM

## 2021-12-08 DIAGNOSIS — R39.11 HESITANCY: Primary | ICD-10-CM

## 2021-12-08 DIAGNOSIS — N40.1 BENIGN PROSTATIC HYPERPLASIA WITH LOWER URINARY TRACT SYMPTOMS, SYMPTOM DETAILS UNSPECIFIED: ICD-10-CM

## 2021-12-08 LAB
BILIRUB UR QL STRIP: NEGATIVE
GLUCOSE UR-MCNC: NEGATIVE MG/DL
KETONES P FAST UR STRIP-MCNC: NEGATIVE MG/DL
PH UR STRIP: 5.5 [PH] (ref 4.6–8)
PROT UR QL STRIP: NORMAL
SP GR UR STRIP: 1.03 (ref 1–1.03)
UA UROBILINOGEN AMB POC: NORMAL (ref 0.2–1)
URINALYSIS CLARITY POC: CLEAR
URINALYSIS COLOR POC: YELLOW
URINE BLOOD POC: NEGATIVE
URINE LEUKOCYTES POC: NEGATIVE
URINE NITRITES POC: NEGATIVE

## 2021-12-08 PROCEDURE — 81003 URINALYSIS AUTO W/O SCOPE: CPT | Performed by: UROLOGY

## 2021-12-08 PROCEDURE — 99244 OFF/OP CNSLTJ NEW/EST MOD 40: CPT | Performed by: UROLOGY

## 2021-12-08 RX ORDER — CIPROFLOXACIN 500 MG/1
500 TABLET ORAL 2 TIMES DAILY
Qty: 20 TABLET | Refills: 0 | Status: SHIPPED | OUTPATIENT
Start: 2021-12-08 | End: 2022-05-03 | Stop reason: ALTCHOICE

## 2021-12-08 NOTE — PROGRESS NOTES
Chief Complaint   Patient presents with    New Patient    Benign Prostatic Hypertrophy     1. Have you been to the ER, urgent care clinic since your last visit? Hospitalized since your last visit? No    2. Have you seen or consulted any other health care providers outside of the [address] [place] [address] since your last visit? Include any pap smears or colon screening.  No  Visit Vitals  BP (!) 147/89 (BP 1 Location: Left upper arm, BP Patient Position: Sitting, BP Cuff Size: Adult)   Pulse 70   Ht 5' 11\" (1.803 m)   Wt 258 lb (117 kg)   SpO2 99%   BMI 35.98 kg/m²

## 2021-12-08 NOTE — LETTER
12/8/2021 10:29 AM    Patient:  Travis Silverman. YOB: 1975  Date of Visit: [birth year]      Dear Kofi Griffith NP  [facility] [phone]: Thank you for referring Mr. Reginaldo Marquez to me for evaluation/treatment. Below are the relevant portions of my assessment and plan of care. If you have questions, please do not hesitate to call me. I look forward to following Mr. Tavarez along with you.         Sincerely,      Lexus Nelson MD

## 2021-12-08 NOTE — PROGRESS NOTES
HISTORY OF PRESENT ILLNESS  Ressizarina Persons. is a 40 y.o. male. Chief Complaint   Patient presents with    New Patient    Benign Prostatic Hypertrophy     He is referred by Kofi Griffith for urinary symptoms. Sometimes he has an urge and is not able to void. He can dribble a little. It lasts only that time and he can void later. He was prescribed tamsulosin but has not taken it yet. He denies dysuria, hematuria, urgency or incontinence. I happens a couple times per month. He used to wake 3-4x per night and now 1-2x. It is a full amount each time. IPSS 8/3. He thinks he was hold his urine too long which may have aggravated his condition. He denies frequency. Today he feels fine. He denies a h/o prostate cancer. He had a prostate check a few years ago. PSA 11/3/21 0.9. He is 40years old and AA. Chronic Conditions Addressed Today     1. Hesitancy - Primary     Relevant Orders     PSA, DIAGNOSTIC (PROSTATE SPECIFIC AG)    2. Benign prostatic hyperplasia with lower urinary tract symptoms     Current Assessment & Plan      Some hesitancy. Plan on treating for prostatitis prior to long term tamsulosin. PSA 0.9, a little high for a 40year old. I would check it again next year.            Relevant Orders     AMB POC URINALYSIS DIP STICK AUTO W/O MICRO     PSA, DIAGNOSTIC (PROSTATE SPECIFIC AG)      Acute Diagnoses Addressed Today     Urine retention        transient        Relevant Orders        PSA, DIAGNOSTIC (PROSTATE SPECIFIC AG)          Past Medical History:    PMHx (including negatives):  has a past medical history of Atypical chest pain (9/24/2020), Cardiovascular stress test abnormal (9/24/2020), Chronic kidney disease, stage 2 (mild) (9/24/2020), Chronic prostatitis (9/24/2020), COVID-19, Dizziness, Essential hypertension (9/24/2020), Kidney disease, chronic, stage II (GFR 60-89 ml/min), Leukopenia (9/24/2020), Lumbosacral radiculopathy (9/24/2020), Right knee pain (9/24/2020), Snoring (9/24/2020), Stomach ulcer, and Thyroid activity decreased (9/24/2020). PSurgHx:  has a past surgical history that includes hx wisdom teeth extraction (2014) and hx orthopaedic (Right). PSocHx:  reports that he quit smoking about 12 years ago. He quit after 12.00 years of use. He has never used smokeless tobacco. He reports previous alcohol use. He reports previous drug use. Drug: Cocaine. ROS  Physical Exam  Genitourinary:     Penis: Normal. No phimosis, hypospadias, tenderness or swelling. Testes: Normal.         Right: Mass, tenderness or swelling not present. Right testis is descended. Left: Mass, tenderness or swelling not present. Left testis is descended. Epididymis:      Right: Not inflamed or enlarged. No tenderness. Left: Not inflamed or enlarged. No tenderness. Prostate: Not enlarged, not tender and no nodules present. Allergies   Allergen Reactions    Vicodin [Hydrocodone-Acetaminophen] Other (comments)     hallucinations      Prior to Admission medications    Medication Sig Start Date End Date Taking? Authorizing Provider   ciprofloxacin HCl (CIPRO) 500 mg tablet Take 1 Tablet by mouth two (2) times a day. 12/8/21  Yes Salvador Hayes MD   ergocalciferol (ERGOCALCIFEROL) 1,250 mcg (50,000 unit) capsule TAKE 1 CAPSULE BY MOUTH ONE TIME PER WEEK INDICATIONS: VITAMIN D DEFICIENCY (HIGH DOSE THERAPY) 11/16/21  Yes Leanna Muller NP   lisinopriL (PRINIVIL, ZESTRIL) 40 mg tablet Take 1 Tablet by mouth daily. 11/16/21  Yes Leanna Muller NP   amLODIPine (NORVASC) 5 mg tablet Take 1 Tablet by mouth daily.  Indications: high blood pressure 10/5/21  Yes Leanna Muller NP   levothyroxine (SYNTHROID) 137 mcg tablet TAKE 1 TABLET BY MOUTH ONCE DAILY BEFORE BREAKFAST FOR LOW THYROID HORMONE 10/5/21  Yes Leanna Muller NP   traZODone (DESYREL) 50 mg tablet TAKE 1 TABLET BY MOUTH EVERY DAY AT NIGHT 8/10/21  Yes Adonay Fink NP gabapentin (NEURONTIN) 300 mg capsule Take 1 Capsule by mouth two (2) times a day. Max Daily Amount: 600 mg. Indications: neuropathic pain, restless legs syndrome, an extreme discomfort in the calf muscles when sitting or lying down 8/10/21  Yes Leanna Muller NP   famotidine (PEPCID) 20 mg tablet TAKE 1 TABLET BY MOUTH EVERY DAY AT NIGHT 8/10/21  Yes Leanna Muller NP   esomeprazole (NEXIUM) 40 mg granules for oral suspension Take 40 mg by mouth daily. Indications: gastroesophageal reflux disease 8/10/21  Yes Leanna Muller NP   cyanocobalamin (VITAMIN B12) 500 mcg tablet TAKE 1 TABLET BY MOUTH ONCE DAILY 8/10/21  Yes Karina Kirk NP        ASSESSMENT and PLAN  Diagnoses and all orders for this visit:    1. Hesitancy  -     PSA, DIAGNOSTIC (PROSTATE SPECIFIC AG); Future    2. Urine retention  Comments:  transient  Orders:  -     PSA, DIAGNOSTIC (PROSTATE SPECIFIC AG); Future    3. Benign prostatic hyperplasia with lower urinary tract symptoms, symptom details unspecified  Assessment & Plan:  Some hesitancy. Plan on treating for prostatitis prior to long term tamsulosin. PSA 0.9, a little high for a 40year old. I would check it again next year. Orders:  -     AMB POC URINALYSIS DIP STICK AUTO W/O MICRO  -     PSA, DIAGNOSTIC (PROSTATE SPECIFIC AG); Future    Other orders  -     ciprofloxacin HCl (CIPRO) 500 mg tablet; Take 1 Tablet by mouth two (2) times a day.          Vesna Hart MD

## 2021-12-08 NOTE — ASSESSMENT & PLAN NOTE
Some hesitancy. Plan on treating for prostatitis prior to long term tamsulosin. PSA 0.9, a little high for a 40year old. I would check it again next year.

## 2021-12-10 RX ORDER — TRAZODONE HYDROCHLORIDE 50 MG/1
TABLET ORAL
Qty: 90 TABLET | Refills: 1 | Status: SHIPPED | OUTPATIENT
Start: 2021-12-10

## 2022-01-31 DIAGNOSIS — I10 ESSENTIAL HYPERTENSION: ICD-10-CM

## 2022-01-31 DIAGNOSIS — K21.9 GASTROESOPHAGEAL REFLUX DISEASE WITHOUT ESOPHAGITIS: ICD-10-CM

## 2022-02-01 RX ORDER — ESOMEPRAZOLE MAGNESIUM 40 MG/1
40 FOR SUSPENSION ORAL DAILY
Qty: 90 PACKET | Refills: 1 | Status: SHIPPED | OUTPATIENT
Start: 2022-02-01 | End: 2022-09-08 | Stop reason: SDUPTHER

## 2022-02-01 RX ORDER — AMLODIPINE BESYLATE 5 MG/1
TABLET ORAL
Qty: 30 TABLET | Refills: 2 | Status: SHIPPED | OUTPATIENT
Start: 2022-02-01 | End: 2022-05-24

## 2022-03-04 DIAGNOSIS — K21.9 GASTROESOPHAGEAL REFLUX DISEASE WITHOUT ESOPHAGITIS: ICD-10-CM

## 2022-03-05 RX ORDER — FAMOTIDINE 20 MG/1
TABLET, FILM COATED ORAL
Qty: 90 TABLET | Refills: 1 | Status: SHIPPED | OUTPATIENT
Start: 2022-03-05 | End: 2022-06-30

## 2022-03-18 PROBLEM — M54.17 LUMBOSACRAL RADICULOPATHY: Status: ACTIVE | Noted: 2020-09-24

## 2022-03-18 PROBLEM — R94.39 CARDIOVASCULAR STRESS TEST ABNORMAL: Status: ACTIVE | Noted: 2020-09-24

## 2022-03-18 PROBLEM — I10 ESSENTIAL HYPERTENSION: Status: ACTIVE | Noted: 2020-09-24

## 2022-03-19 PROBLEM — D72.819 LEUKOPENIA: Status: ACTIVE | Noted: 2020-09-24

## 2022-03-19 PROBLEM — E66.01 SEVERE OBESITY (HCC): Status: ACTIVE | Noted: 2020-11-06

## 2022-03-19 PROBLEM — R06.83 SNORING: Status: ACTIVE | Noted: 2020-09-24

## 2022-03-19 PROBLEM — N18.2 CHRONIC KIDNEY DISEASE, STAGE 2 (MILD): Status: ACTIVE | Noted: 2020-09-24

## 2022-03-19 PROBLEM — N41.1 CHRONIC PROSTATITIS: Status: ACTIVE | Noted: 2020-09-24

## 2022-03-19 PROBLEM — E03.9 THYROID ACTIVITY DECREASED: Status: ACTIVE | Noted: 2020-09-24

## 2022-03-19 PROBLEM — N40.1 BENIGN PROSTATIC HYPERPLASIA WITH LOWER URINARY TRACT SYMPTOMS: Status: ACTIVE | Noted: 2021-12-08

## 2022-03-19 PROBLEM — R39.11 HESITANCY: Status: ACTIVE | Noted: 2021-12-08

## 2022-03-19 PROBLEM — R07.89 ATYPICAL CHEST PAIN: Status: ACTIVE | Noted: 2020-09-24

## 2022-03-20 PROBLEM — M25.561 RIGHT KNEE PAIN: Status: ACTIVE | Noted: 2020-09-24

## 2022-04-17 DIAGNOSIS — E03.9 ACQUIRED HYPOTHYROIDISM: ICD-10-CM

## 2022-04-22 RX ORDER — LEVOTHYROXINE SODIUM 137 UG/1
TABLET ORAL
Qty: 90 TABLET | Refills: 1 | Status: SHIPPED | OUTPATIENT
Start: 2022-04-22 | End: 2022-09-03

## 2022-05-03 ENCOUNTER — OFFICE VISIT (OUTPATIENT)
Dept: FAMILY MEDICINE CLINIC | Age: 47
End: 2022-05-03
Payer: MEDICAID

## 2022-05-03 VITALS
TEMPERATURE: 98.3 F | BODY MASS INDEX: 37.15 KG/M2 | DIASTOLIC BLOOD PRESSURE: 91 MMHG | OXYGEN SATURATION: 98 % | RESPIRATION RATE: 20 BRPM | WEIGHT: 265.4 LBS | HEIGHT: 71 IN | SYSTOLIC BLOOD PRESSURE: 140 MMHG | HEART RATE: 63 BPM

## 2022-05-03 DIAGNOSIS — K21.9 GASTROESOPHAGEAL REFLUX DISEASE WITHOUT ESOPHAGITIS: ICD-10-CM

## 2022-05-03 DIAGNOSIS — E03.9 ACQUIRED HYPOTHYROIDISM: ICD-10-CM

## 2022-05-03 DIAGNOSIS — E55.9 VITAMIN D DEFICIENCY: Primary | ICD-10-CM

## 2022-05-03 DIAGNOSIS — I10 ESSENTIAL HYPERTENSION: ICD-10-CM

## 2022-05-03 PROCEDURE — 99214 OFFICE O/P EST MOD 30 MIN: CPT | Performed by: NURSE PRACTITIONER

## 2022-05-03 NOTE — PROGRESS NOTES
Chief Complaint   Patient presents with    Hypertension    Cholesterol Problem    Thyroid Problem    Follow-up     lab review     Visit Vitals  BP (!) 140/91 (BP 1 Location: Left upper arm, BP Patient Position: Sitting, BP Cuff Size: Adult)   Pulse 63   Temp 98.3 °F (36.8 °C) (Temporal)   Resp 20   Ht 5' 11\" (1.803 m)   Wt 265 lb 6.4 oz (120.4 kg)   SpO2 98%   BMI 37.02 kg/m²       1. \"Have you been to the ER, urgent care clinic since your last visit? Hospitalized since your last visit? \" No    2. \"Have you seen or consulted any other health care providers outside of the [address] [place] [address] since your last visit? \" No     3. For patients aged 39-70: Has the patient had a colonoscopy / FIT/ Cologuard? No      If the patient is female:    4. For patients aged 41-77: Has the patient had a mammogram within the past 2 years? NA - based on age or sex      11. For patients aged 21-65: Has the patient had a pap smear?  NA - based on age or sex

## 2022-05-03 NOTE — PROGRESS NOTES
Flako Colon (: 1975) is a 40 y.o. male, established patient, here for evaluation of the following chief complaint(s):  Hypertension, Cholesterol Problem, Thyroid Problem, and Follow-up (lab review)         ASSESSMENT/PLAN:  Below is the assessment and plan developed based on review of pertinent history, physical exam, labs, studies, and medications. Based on today's findings we will proceed as follows:     1. Vitamin D deficiency  -     VITAMIN D, 25 HYDROXY  2. Essential hypertension  -     CBC WITH AUTOMATED DIFF  -     METABOLIC PANEL, COMPREHENSIVE  -     LIPID PANEL  -     THYROID CASCADE PROFILE  -     MICROALBUMIN, UR, RAND W/ MICROALB/CREAT RATIO  3. Gastroesophageal reflux disease without esophagitis  4. Acquired hypothyroidism  -     CBC WITH AUTOMATED DIFF  -     METABOLIC PANEL, COMPREHENSIVE  -     LIPID PANEL  -     THYROID CASCADE PROFILE  -     MICROALBUMIN, UR, RAND W/ MICROALB/CREAT RATIO  Recent lab review and stable. Hypertension, vitamin D deficiency, GERD, hypothyroidism well controlled. Issues discussed include maintaining a diet, weight control and exercise, continued home blood pressure monitoring, maintaining medication compliance, medication side effects, long term hypertension complications, and refraining from tobacco use. Treatment plan: Continue current medications, maintaining low sodium diet, blood pressure goal discussed, labwork from today discussed and reviewed, future labwork ordered, A1C, lipids check, patient up to date on maintence and screening exams. Return in about 6 months (around 11/3/2022) for Annual wellness, HTN, Insomnia, GERD, Hypothyroid, Hyperlipidemia. SUBJECTIVE/OBJECTIVE:  HPI  Patient presenting for hypertension followup, vitamin D deficiency, GERD follow-up, hypothyroidism.  Patient reports blood pressures at home most frequently normal. Patient has symptoms of none of the following; no chest pain, shortness of breath, weakness, orthostatic hypotension, cough, myalgias, rash, headaches, weight gain, leg swelling, palpitations, slow heart rate, fatigue, depression. Patient reports good compliance with medications, no side effects from medications noted. Current treatments include diet modification, weight loss. Reports GERD well controlled with currently prescribed medication with no issues. Review of Systems  All other systems reviewed and are negative. Visit Vitals  BP (!) 140/91 (BP 1 Location: Left upper arm, BP Patient Position: Sitting, BP Cuff Size: Adult)   Pulse 63   Temp 98.3 °F (36.8 °C) (Temporal)   Resp 20   Ht 5' 11\" (1.803 m)   Wt 265 lb 6.4 oz (120.4 kg)   SpO2 98%   BMI 37.02 kg/m²       Physical Exam  Constitutional:  No acute distress  HEENT:  Head normocephalic and atraumatic. CV:  Regular rate and rhythm. No murmur. Respiratory:  Lungs clear to auscultation bilaterally  Abdomen:  Soft, non-tender. Skin:  Normal color. Warm and Dry  Extremities:  Non-tender. No pedal edema. Back:  No tenderness  Neuro:  No gross motor deficits    On this date 05/03/2022 I have spent 35 minutes reviewing previous notes, test results and face to face with the patient discussing the diagnosis and importance of compliance with the treatment plan as well as documenting on the day of the visit. Aspects of this note may have been generated using voice recognition software. Despite editing, there may be some syntax errors. An electronic signature was used to authenticate this note.   -- Alessandro Burciaga NP

## 2022-05-06 LAB
25(OH)D3+25(OH)D2 SERPL-MCNC: 22.9 NG/ML (ref 30–100)
ALBUMIN SERPL-MCNC: 4.5 G/DL (ref 4–5)
ALBUMIN/CREAT UR: 10 MG/G CREAT (ref 0–29)
ALBUMIN/GLOB SERPL: 1.9 {RATIO} (ref 1.2–2.2)
ALP SERPL-CCNC: 78 IU/L (ref 44–121)
ALT SERPL-CCNC: 21 IU/L (ref 0–44)
AST SERPL-CCNC: 20 IU/L (ref 0–40)
BASOPHILS # BLD AUTO: 0 X10E3/UL (ref 0–0.2)
BASOPHILS NFR BLD AUTO: 1 %
BILIRUB SERPL-MCNC: 0.6 MG/DL (ref 0–1.2)
BUN SERPL-MCNC: 13 MG/DL (ref 6–24)
BUN/CREAT SERPL: 9 (ref 9–20)
CALCIUM SERPL-MCNC: 9 MG/DL (ref 8.7–10.2)
CHLORIDE SERPL-SCNC: 103 MMOL/L (ref 96–106)
CHOLEST SERPL-MCNC: 161 MG/DL (ref 100–199)
CO2 SERPL-SCNC: 23 MMOL/L (ref 20–29)
CREAT SERPL-MCNC: 1.37 MG/DL (ref 0.76–1.27)
CREAT UR-MCNC: 178.3 MG/DL
EGFR: 65 ML/MIN/1.73
EOSINOPHIL # BLD AUTO: 0.1 X10E3/UL (ref 0–0.4)
EOSINOPHIL NFR BLD AUTO: 2 %
ERYTHROCYTE [DISTWIDTH] IN BLOOD BY AUTOMATED COUNT: 13.1 % (ref 11.6–15.4)
GLOBULIN SER CALC-MCNC: 2.4 G/DL (ref 1.5–4.5)
GLUCOSE SERPL-MCNC: 98 MG/DL (ref 65–99)
HCT VFR BLD AUTO: 43.4 % (ref 37.5–51)
HDLC SERPL-MCNC: 43 MG/DL
HGB BLD-MCNC: 14.5 G/DL (ref 13–17.7)
IMM GRANULOCYTES # BLD AUTO: 0 X10E3/UL (ref 0–0.1)
IMM GRANULOCYTES NFR BLD AUTO: 0 %
LDLC SERPL CALC-MCNC: 101 MG/DL (ref 0–99)
LYMPHOCYTES # BLD AUTO: 1.1 X10E3/UL (ref 0.7–3.1)
LYMPHOCYTES NFR BLD AUTO: 28 %
MCH RBC QN AUTO: 29.8 PG (ref 26.6–33)
MCHC RBC AUTO-ENTMCNC: 33.4 G/DL (ref 31.5–35.7)
MCV RBC AUTO: 89 FL (ref 79–97)
MICROALBUMIN UR-MCNC: 18.1 UG/ML
MONOCYTES # BLD AUTO: 0.3 X10E3/UL (ref 0.1–0.9)
MONOCYTES NFR BLD AUTO: 7 %
NEUTROPHILS # BLD AUTO: 2.4 X10E3/UL (ref 1.4–7)
NEUTROPHILS NFR BLD AUTO: 62 %
PLATELET # BLD AUTO: 239 X10E3/UL (ref 150–450)
POTASSIUM SERPL-SCNC: 4.1 MMOL/L (ref 3.5–5.2)
PROT SERPL-MCNC: 6.9 G/DL (ref 6–8.5)
RBC # BLD AUTO: 4.87 X10E6/UL (ref 4.14–5.8)
SODIUM SERPL-SCNC: 140 MMOL/L (ref 134–144)
TRIGL SERPL-MCNC: 89 MG/DL (ref 0–149)
TSH SERPL DL<=0.005 MIU/L-ACNC: 2.74 UIU/ML (ref 0.45–4.5)
VLDLC SERPL CALC-MCNC: 17 MG/DL (ref 5–40)
WBC # BLD AUTO: 3.8 X10E3/UL (ref 3.4–10.8)

## 2022-05-24 DIAGNOSIS — I10 ESSENTIAL HYPERTENSION: ICD-10-CM

## 2022-05-24 RX ORDER — AMLODIPINE BESYLATE 5 MG/1
TABLET ORAL
Qty: 30 TABLET | Refills: 2 | Status: SHIPPED | OUTPATIENT
Start: 2022-05-24 | End: 2022-09-03

## 2022-06-27 DIAGNOSIS — K21.9 GASTROESOPHAGEAL REFLUX DISEASE WITHOUT ESOPHAGITIS: ICD-10-CM

## 2022-06-30 RX ORDER — FAMOTIDINE 20 MG/1
TABLET, FILM COATED ORAL
Qty: 90 TABLET | Refills: 1 | Status: SHIPPED | OUTPATIENT
Start: 2022-06-30

## 2022-09-02 DIAGNOSIS — I10 ESSENTIAL HYPERTENSION: ICD-10-CM

## 2022-09-02 DIAGNOSIS — E03.9 ACQUIRED HYPOTHYROIDISM: ICD-10-CM

## 2022-09-03 RX ORDER — AMLODIPINE BESYLATE 5 MG/1
TABLET ORAL
Qty: 30 TABLET | Refills: 2 | Status: SHIPPED | OUTPATIENT
Start: 2022-09-03

## 2022-09-03 RX ORDER — LEVOTHYROXINE SODIUM 137 UG/1
TABLET ORAL
Qty: 90 TABLET | Refills: 1 | Status: SHIPPED | OUTPATIENT
Start: 2022-09-03

## 2022-09-07 DIAGNOSIS — K21.9 GASTROESOPHAGEAL REFLUX DISEASE WITHOUT ESOPHAGITIS: ICD-10-CM

## 2022-09-08 NOTE — TELEPHONE ENCOUNTER
Last OV: 5/3/22  Next OV: 11/4/22  Last Refill: 2/1/22 (QTY 90, refills 1)      Will send to provider for review/authorization

## 2022-09-12 RX ORDER — ESOMEPRAZOLE MAGNESIUM 40 MG/1
40 FOR SUSPENSION ORAL DAILY
Qty: 90 PACKET | Refills: 1 | Status: SHIPPED | OUTPATIENT
Start: 2022-09-12

## 2022-10-21 ENCOUNTER — VIRTUAL VISIT (OUTPATIENT)
Dept: FAMILY MEDICINE CLINIC | Age: 47
End: 2022-10-21
Payer: MEDICAID

## 2022-10-21 DIAGNOSIS — K21.9 GASTROESOPHAGEAL REFLUX DISEASE WITHOUT ESOPHAGITIS: ICD-10-CM

## 2022-10-21 DIAGNOSIS — M54.50 LOW BACK PAIN, UNSPECIFIED BACK PAIN LATERALITY, UNSPECIFIED CHRONICITY, UNSPECIFIED WHETHER SCIATICA PRESENT: Primary | ICD-10-CM

## 2022-10-21 PROCEDURE — 99213 OFFICE O/P EST LOW 20 MIN: CPT | Performed by: NURSE PRACTITIONER

## 2022-10-21 RX ORDER — PANTOPRAZOLE SODIUM 40 MG/1
40 TABLET, DELAYED RELEASE ORAL DAILY
Qty: 90 TABLET | Refills: 1 | Status: SHIPPED | OUTPATIENT
Start: 2022-10-21

## 2022-10-21 NOTE — PROGRESS NOTES
Romaine Pritchett who was evaluated through a synchronous (real-time) audio-video encounter, and/or the patient's healthcare decision maker, is aware that it is a billable service, which includes applicable co-pays, with coverage as determined by the patient's insurance carrier. Romaine Pritchett provided verbal consent to proceed and patient identification was verified. This visit was conducted pursuant to the emergency declaration under the [address], [address] [place] [address] and the [employer] Act. A caregiver was present when appropriate. Ability to conduct physical exam was limited. The patient was located at home in a state where the provider was licensed to provide care. Romaine Pritchett (: 1975) is a 56 y.o. male, established patient, here for evaluation of the following chief complaint(s):   Back Pain         ASSESSMENT/PLAN:  Below is the assessment and plan developed based on review of pertinent labs, studies, and medications. Based on today's findings we will proceed as follows:     1. Low back pain, unspecified back pain laterality, unspecified chronicity, unspecified whether sciatica present  2. Gastroesophageal reflux disease without esophagitis  -     pantoprazole (PROTONIX) 40 mg tablet; Take 1 Tablet by mouth daily. , Normal, Disp-90 Tablet, R-1    Educated on medication, uses, side effects, as well as signs and symptoms that may merit immediate medical attention. Patient is not to share medication and use only as indicated. Patient verbalized understanding and agreement. If symptoms dont improve or worsen call the clinic or go to urgent in the 2-3 days for further evaluation  No follow-ups on file. SUBJECTIVE/OBJECTIVE:  HPI    Patient is a 56 y.o. M here to follow-up on low back pain for which he went to the hospital on .   Patient reports was barely able to move suddenly with no known injury. Improved with NSAIDs, prednisone, and muscle relaxant, almost resolved/      Review of Systems   All other systems reviewed and are negative.     Patient-Reported Vitals 10/9/2020   Patient-Reported Weight 244   Patient-Reported Pulse 80   Patient-Reported Temperature 98.3   Patient-Reported Systolic  292   Patient-Reported Diastolic 80       Physical Exam    [INSTRUCTIONS:  \"[x]\" Indicates a positive item  \"[]\" Indicates a negative item  -- DELETE ALL ITEMS NOT EXAMINED]    Constitutional: [x] Appears well-developed and well-nourished [x] No apparent distress      [] Abnormal -     Mental status: [x] Alert and awake  [x] Oriented to person/place/time [x] Able to follow commands    [] Abnormal -     Eyes:   EOM    [x]  Normal    [] Abnormal -   Sclera  [x]  Normal    [] Abnormal -          Discharge [x]  None visible   [] Abnormal -     HENT: [x] Normocephalic, atraumatic  [] Abnormal -   [x] Mouth/Throat: Mucous membranes are moist    External Ears [x] Normal  [] Abnormal -    Neck: [x] No visualized mass [] Abnormal -     Pulmonary/Chest: [x] Respiratory effort normal   [x] No visualized signs of difficulty breathing or respiratory distress        [] Abnormal -      Musculoskeletal:   [x] Normal gait with no signs of ataxia         [x] Normal range of motion of neck        [] Abnormal -     Neurological:        [x] No Facial Asymmetry (Cranial nerve 7 motor function) (limited exam due to video visit)          [x] No gaze palsy        [] Abnormal -          Skin:        [x] No significant exanthematous lesions or discoloration noted on facial skin         [] Abnormal -            Psychiatric:       [x] Normal Affect [] Abnormal -        [x] No Hallucinations    Other pertinent observable physical exam findings:-    On this date 10/21/2022 I have spent 24 minutes reviewing previous notes, test results and face to face (virtual) with the patient discussing the diagnosis and importance of compliance with the treatment plan as well as documenting on the day of the visit. Toñito Wilson, was evaluated through a synchronous (real-time) audio-video encounter. The patient (or guardian if applicable) is aware that this is a billable service. Verbal consent to proceed has been obtained within the past 12 months. The visit was conducted pursuant to the emergency declaration under the [address] [place] [address] and the [email or web address] Act. Patient identification was verified, and a caregiver was present when appropriate. The patient was located in a state where the provider was credentialed to provide care. Aspects of this note may have been generated using voice recognition software. Despite editing, there may be some syntax errors. An electronic signature was used to authenticate this note.   -- Floridalma Thomas NP

## 2022-11-04 ENCOUNTER — OFFICE VISIT (OUTPATIENT)
Dept: FAMILY MEDICINE CLINIC | Age: 47
End: 2022-11-04
Payer: MEDICAID

## 2022-11-04 VITALS
WEIGHT: 268.2 LBS | HEIGHT: 71 IN | RESPIRATION RATE: 14 BRPM | BODY MASS INDEX: 37.55 KG/M2 | SYSTOLIC BLOOD PRESSURE: 167 MMHG | HEART RATE: 74 BPM | OXYGEN SATURATION: 98 % | DIASTOLIC BLOOD PRESSURE: 97 MMHG | TEMPERATURE: 98.7 F

## 2022-11-04 DIAGNOSIS — I10 ESSENTIAL HYPERTENSION: ICD-10-CM

## 2022-11-04 DIAGNOSIS — Z00.00 ANNUAL PHYSICAL EXAM: Primary | ICD-10-CM

## 2022-11-04 DIAGNOSIS — E78.5 HYPERLIPIDEMIA, UNSPECIFIED HYPERLIPIDEMIA TYPE: ICD-10-CM

## 2022-11-04 DIAGNOSIS — M54.50 CHRONIC BILATERAL LOW BACK PAIN WITHOUT SCIATICA: ICD-10-CM

## 2022-11-04 DIAGNOSIS — R06.83 SNORING: ICD-10-CM

## 2022-11-04 DIAGNOSIS — R39.14 BENIGN PROSTATIC HYPERPLASIA WITH INCOMPLETE BLADDER EMPTYING: ICD-10-CM

## 2022-11-04 DIAGNOSIS — G89.29 CHRONIC BILATERAL LOW BACK PAIN WITHOUT SCIATICA: ICD-10-CM

## 2022-11-04 DIAGNOSIS — N40.1 BENIGN PROSTATIC HYPERPLASIA WITH INCOMPLETE BLADDER EMPTYING: ICD-10-CM

## 2022-11-04 DIAGNOSIS — E03.9 ACQUIRED HYPOTHYROIDISM: ICD-10-CM

## 2022-11-04 PROCEDURE — 3078F DIAST BP <80 MM HG: CPT | Performed by: NURSE PRACTITIONER

## 2022-11-04 PROCEDURE — 99396 PREV VISIT EST AGE 40-64: CPT | Performed by: NURSE PRACTITIONER

## 2022-11-04 PROCEDURE — 99214 OFFICE O/P EST MOD 30 MIN: CPT | Performed by: NURSE PRACTITIONER

## 2022-11-04 PROCEDURE — 3074F SYST BP LT 130 MM HG: CPT | Performed by: NURSE PRACTITIONER

## 2022-11-04 PROCEDURE — 93000 ELECTROCARDIOGRAM COMPLETE: CPT | Performed by: NURSE PRACTITIONER

## 2022-11-04 RX ORDER — TAMSULOSIN HYDROCHLORIDE 0.4 MG/1
0.4 CAPSULE ORAL DAILY
Qty: 90 CAPSULE | Refills: 1 | Status: SHIPPED | OUTPATIENT
Start: 2022-11-04

## 2022-11-04 RX ORDER — METOPROLOL SUCCINATE 25 MG/1
25 TABLET, EXTENDED RELEASE ORAL
Qty: 90 TABLET | Refills: 1 | Status: SHIPPED | OUTPATIENT
Start: 2022-11-04

## 2022-11-04 RX ORDER — DICLOFENAC SODIUM 10 MG/G
GEL TOPICAL
COMMUNITY
Start: 2022-09-23

## 2022-11-04 RX ORDER — MELOXICAM 15 MG/1
15 TABLET ORAL DAILY
Qty: 90 TABLET | Refills: 1 | Status: SHIPPED | OUTPATIENT
Start: 2022-11-04

## 2022-11-04 NOTE — PROGRESS NOTES
1. Have you been to the ER, urgent care clinic since your last visit? Hospitalized since your last visit? Yes When: 08/2022 x 2 Where: ER [facility]  Reason for visit: Back  and Sore Throat       2. Have you seen or consulted any other health care providers outside of the [address] [place] [address] since your last visit? Include any pap smears or colon screening.   Yes:[facility]: Kidney     Chief Complaint   Patient presents with    Physical     Visit Vitals  BP (!) 164/100 (BP 1 Location: Left upper arm, BP Patient Position: Sitting, BP Cuff Size: Large adult)   Pulse 64   Temp 98.7 °F (37.1 °C) (Temporal)   Resp 14   Ht 5' 11\" (1.803 m)   Wt 268 lb 3.2 oz (121.7 kg)   SpO2 98%   BMI 37.41 kg/m²     Visit Vitals  BP (!) 167/97 (BP 1 Location: Right upper arm, BP Patient Position: Sitting, BP Cuff Size: Large adult)   Pulse 74   Temp 98.7 °F (37.1 °C) (Temporal)   Resp 14   Ht 5' 11\" (1.803 m)   Wt 268 lb 3.2 oz (121.7 kg)   SpO2 98%   BMI 37.41 kg/m²

## 2022-11-04 NOTE — PROGRESS NOTES
Subjective:   Casie Davies is a 56 y.o. male presenting for his annual checkup. ROS:  Feeling well. No dyspnea or chest pain on exertion. No abdominal pain, change in bowel habits, black or bloody stools. Does report urinary tract and prostatic symptoms. No neurological complaints. Specific concerns today: Patient is a 50-year-old male the presents today for annual physical with concerns of changes in his urinary routine. Patient reports for the past several months have suffer from feelings of urgency and incomplete bladder emptying. Patient denies any blood in the urine. Patient denies getting his prostate exam done in a while. Patient reports extensive family history of enlarged prostate. Patient reports no fever, chills, nausea, vomiting, diarrhea. Patient reports same partner his wife with no deviation from his sexual activities. Patient also With concern of chronic back pain recently had acute exacerbation for which he went to the ER and was prescribed NSAIDs, prednisone, and muscle relaxant which helped resolve the issue. Have been taking his blood pressure medication as directed with no issues denies any chest pains, shortness of breath, palpitations, dizziness, headaches. Patient and spouse also with concerns of loud snoring for the past several years. Patient reports issues with insomnia and daytime sleepiness. Has tried melatonin with no improvement. .    Patient Active Problem List   Diagnosis Code    Thyroid activity decreased E03.9    Right knee pain M25.561    Atypical chest pain R07.89    Cardiovascular stress test abnormal R94.39    Chronic kidney disease, stage 2 (mild) N18.2    Chronic prostatitis N41.1    Essential hypertension I10    Leukopenia D72.819    Lumbosacral radiculopathy M54.17    Snoring R06.83    Severe obesity (HCC) E66.01    Hesitancy R39.11    Benign prostatic hyperplasia with lower urinary tract symptoms N40.1     Patient Active Problem List    Diagnosis Date Noted    Hesitancy 12/08/2021    Benign prostatic hyperplasia with lower urinary tract symptoms 12/08/2021    Severe obesity ([address] 11/06/2020    Thyroid activity decreased 09/24/2020    Right knee pain 09/24/2020    Atypical chest pain 09/24/2020    Cardiovascular stress test abnormal 09/24/2020    Chronic kidney disease, stage 2 (mild) 09/24/2020    Chronic prostatitis 09/24/2020    Essential hypertension 09/24/2020    Leukopenia 09/24/2020    Lumbosacral radiculopathy 09/24/2020    Snoring 09/24/2020     Current Outpatient Medications   Medication Sig Dispense Refill    diclofenac (VOLTAREN) 1 % gel APPLY TOPICALLY 4 TIMES A DAY      metoprolol succinate (TOPROL-XL) 25 mg XL tablet Take 1 Tablet by mouth nightly. Indications: high blood pressure 90 Tablet 1    tamsulosin (Flomax) 0.4 mg capsule Take 1 Capsule by mouth daily. Indications: enlarged prostate with urination problem 90 Capsule 1    meloxicam (MOBIC) 15 mg tablet Take 1 Tablet by mouth daily. Indications: joint damage causing pain and loss of function 90 Tablet 1    pantoprazole (PROTONIX) 40 mg tablet Take 1 Tablet by mouth daily. 90 Tablet 1    esomeprazole (NEXIUM) 40 mg granules for oral suspension Take 40 mg by mouth daily. Indications: gastroesophageal reflux disease 90 Packet 1    amLODIPine (NORVASC) 5 mg tablet TAKE 1 TABLET BY MOUTH EVERY DAY FOR BLOOD PRESSURE 30 Tablet 2    levothyroxine (SYNTHROID) 137 mcg tablet TAKE 1 TABLET BY MOUTH ONCE DAILY BEFORE BREAKFAST FOR LOW THYROID HORMONE 90 Tablet 1    famotidine (PEPCID) 20 mg tablet TAKE 1 TABLET BY MOUTH EVERY DAY AT NIGHT 90 Tablet 1    traZODone (DESYREL) 50 mg tablet TAKE 1 TABLET BY MOUTH EVERY DAY AT NIGHT 90 Tablet 1    ergocalciferol (ERGOCALCIFEROL) 1,250 mcg (50,000 unit) capsule TAKE 1 CAPSULE BY MOUTH ONE TIME PER WEEK INDICATIONS: VITAMIN D DEFICIENCY (HIGH DOSE THERAPY) 12 Capsule 0    lisinopriL (PRINIVIL, ZESTRIL) 40 mg tablet Take 1 Tablet by mouth daily.  90 Tablet 3 cyanocobalamin (VITAMIN B12) 500 mcg tablet TAKE 1 TABLET BY MOUTH ONCE DAILY 90 Tablet 1     Allergies   Allergen Reactions    Vicodin [Hydrocodone-Acetaminophen] Other (comments)     hallucinations     Past Medical History:   Diagnosis Date    Atypical chest pain 2020    Cardiovascular stress test abnormal 2020    Chronic kidney disease, stage 2 (mild) 2020    Chronic prostatitis 2020    COVID-19     Dizziness     Essential hypertension 2020    Kidney disease, chronic, stage II (GFR 60-89 ml/min)     Leukopenia 2020    Lumbosacral radiculopathy 2020    Right knee pain 2020    Snoring 2020    Stomach ulcer     Thyroid activity decreased 2020     Past Surgical History:   Procedure Laterality Date    HX ORTHOPAEDIC Right     meniscus repair    HX WISDOM TEETH EXTRACTION       Family History   Problem Relation Age of Onset    Hypertension Mother     Diabetes Father         insulin dependent     Hypertension Father     Stroke Paternal Aunt 80    Cancer Paternal Uncle         liver cancer (heavy drinker)    Cancer Maternal Grandfather         throat cancer      Social History     Tobacco Use    Smoking status: Former     Years: 12.00     Types: Cigarettes     Quit date:      Years since quittin.8    Smokeless tobacco: Never   Substance Use Topics    Alcohol use: Not Currently        Lab Results   Component Value Date/Time    WBC 4.3 2022 02:38 PM    HGB 14.7 2022 02:38 PM    HCT 44.6 2022 02:38 PM    PLATELET 071  02:38 PM    MCV 89 2022 02:38 PM     Lab Results   Component Value Date/Time    Hemoglobin A1c, External A1C 5.9  EAG  123 2019 12:00 AM    Glucose 98 2022 02:38 PM    Microalb/Creat ratio (ug/mg creat.) 9 2022 02:38 PM    LDL, calculated 101 (H) 2022 02:38 PM    Creatinine 1.38 (H) 2022 02:38 PM      Lab Results   Component Value Date/Time    Cholesterol, total 157 2022 02:38 PM HDL Cholesterol 44 11/04/2022 02:38 PM    LDL, calculated 101 (H) 11/04/2022 02:38 PM    Triglyceride 59 11/04/2022 02:38 PM        Objective:     Visit Vitals  BP (!) 167/97 (BP 1 Location: Right upper arm, BP Patient Position: Sitting, BP Cuff Size: Large adult)   Pulse 74   Temp 98.7 °F (37.1 °C) (Temporal)   Resp 14   Ht 5' 11\" (1.803 m)   Wt 268 lb 3.2 oz (121.7 kg)   SpO2 98%   BMI 37.41 kg/m²     The patient appears well, alert, oriented x 3, in no distress. ENT normal.  Neck supple. No adenopathy or thyromegaly. LEONORA. Lungs are clear, good air entry, no wheezes, rhonchi or rales. S1 and S2 normal, no murmurs, regular rate and rhythm. Abdomen is soft without tenderness, guarding, mass or organomegaly.  exam: no penile lesions or discharge, no testicular masses or tenderness, no hernias, PROSTATE EXAM: smooth and symmetric without nodules or tenderness, enlarged. Extremities show no edema, normal peripheral pulses. Neurological is normal without focal findings. Assessment/Plan:   healthy adult male  Prostate exam showed enlarged prostate which may explain his urinary symptoms. Refer to urology and conduct PSA to further evaluate. Blood pressure elevated today for which we perform an EKG in clinic. EKG: normal EKG, normal sinus rhythm, unchanged from previous tracings. We will change his blood pressure management at this time to better manage his blood pressure. Also with elevated blood pressure and the history of snoring patient has been referred to cardiology and sleep study to further evaluate. Issues discussed include maintaining a diet, weight control and exercise, continued home blood pressure monitoring, maintaining medication compliance, medication side effects, long term hypertension complications, and refraining from tobacco use.   Treatment plan: Continue current medications, no changes, maintaining low sodium diet, blood pressure goal discussed, labwork from today discussed and reviewed, future labwork ordered, A1C, lipids check, patient up to date on maintence and screening exams. bring BP log to office visit, follow low fat diet, follow low salt diet, routine labs ordered, have labs drawn prior to ROV, call if any problems. ICD-10-CM ICD-9-CM    1. Annual physical exam  Z00.00 V70.0 CBC WITH AUTOMATED DIFF      METABOLIC PANEL, COMPREHENSIVE      LIPID PANEL      THYROID CASCADE PROFILE      PSA W/ REFLX FREE PSA      2. Essential hypertension  I10 401.9 AMB POC EKG ROUTINE W/ 12 LEADS, INTER & REP      REFERRAL TO CARDIOLOGY      CBC WITH AUTOMATED DIFF      METABOLIC PANEL, COMPREHENSIVE      LIPID PANEL      THYROID CASCADE PROFILE      MICROALBUMIN, UR, RAND W/ MICROALB/CREAT RATIO      metoprolol succinate (TOPROL-XL) 25 mg XL tablet      3. Acquired hypothyroidism  E03.9 244.9 THYROID CASCADE PROFILE      4. Benign prostatic hyperplasia with incomplete bladder emptying  N40.1 600.01 PSA W/ REFLX FREE PSA    R39.14 788.21 tamsulosin (Flomax) 0.4 mg capsule      5. Snoring  R06.83 786.09 SLEEP MEDICINE REFERRAL      REFERRAL TO CARDIOLOGY      6. Hyperlipidemia, unspecified hyperlipidemia type  E78.5 272.4 CBC WITH AUTOMATED DIFF      METABOLIC PANEL, COMPREHENSIVE      LIPID PANEL      THYROID CASCADE PROFILE      7. Chronic bilateral low back pain without sciatica  M54.50 724.2 meloxicam (MOBIC) 15 mg tablet    G89.29 338.29         Follow-up and Dispositions    Return in about 3 months (around 2/4/2023) for HTN, Hyperlipidemia, Lab review, sleep apnea, floam for BPH. Brynn Brown

## 2022-11-06 LAB
ALBUMIN SERPL-MCNC: 4.7 G/DL (ref 4–5)
ALBUMIN/CREAT UR: 9 MG/G CREAT (ref 0–29)
ALBUMIN/GLOB SERPL: 2.4 {RATIO} (ref 1.2–2.2)
ALP SERPL-CCNC: 76 IU/L (ref 44–121)
ALT SERPL-CCNC: 23 IU/L (ref 0–44)
AST SERPL-CCNC: 25 IU/L (ref 0–40)
BASOPHILS # BLD AUTO: 0.1 X10E3/UL (ref 0–0.2)
BASOPHILS NFR BLD AUTO: 1 %
BILIRUB SERPL-MCNC: 0.6 MG/DL (ref 0–1.2)
BUN SERPL-MCNC: 15 MG/DL (ref 6–24)
BUN/CREAT SERPL: 11 (ref 9–20)
CALCIUM SERPL-MCNC: 9.7 MG/DL (ref 8.7–10.2)
CHLORIDE SERPL-SCNC: 102 MMOL/L (ref 96–106)
CHOLEST SERPL-MCNC: 157 MG/DL (ref 100–199)
CO2 SERPL-SCNC: 26 MMOL/L (ref 20–29)
CREAT SERPL-MCNC: 1.38 MG/DL (ref 0.76–1.27)
CREAT UR-MCNC: 171.1 MG/DL
EGFR: 64 ML/MIN/1.73
EOSINOPHIL # BLD AUTO: 0.1 X10E3/UL (ref 0–0.4)
EOSINOPHIL NFR BLD AUTO: 3 %
ERYTHROCYTE [DISTWIDTH] IN BLOOD BY AUTOMATED COUNT: 13.3 % (ref 11.6–15.4)
GLOBULIN SER CALC-MCNC: 2 G/DL (ref 1.5–4.5)
GLUCOSE SERPL-MCNC: 98 MG/DL (ref 70–99)
HCT VFR BLD AUTO: 44.6 % (ref 37.5–51)
HDLC SERPL-MCNC: 44 MG/DL
HGB BLD-MCNC: 14.7 G/DL (ref 13–17.7)
IMM GRANULOCYTES # BLD AUTO: 0 X10E3/UL (ref 0–0.1)
IMM GRANULOCYTES NFR BLD AUTO: 0 %
LDLC SERPL CALC-MCNC: 101 MG/DL (ref 0–99)
LYMPHOCYTES # BLD AUTO: 1.2 X10E3/UL (ref 0.7–3.1)
LYMPHOCYTES NFR BLD AUTO: 27 %
MCH RBC QN AUTO: 29.3 PG (ref 26.6–33)
MCHC RBC AUTO-ENTMCNC: 33 G/DL (ref 31.5–35.7)
MCV RBC AUTO: 89 FL (ref 79–97)
MICROALBUMIN UR-MCNC: 15.3 UG/ML
MONOCYTES # BLD AUTO: 0.3 X10E3/UL (ref 0.1–0.9)
MONOCYTES NFR BLD AUTO: 6 %
NEUTROPHILS # BLD AUTO: 2.7 X10E3/UL (ref 1.4–7)
NEUTROPHILS NFR BLD AUTO: 63 %
PLATELET # BLD AUTO: 225 X10E3/UL (ref 150–450)
POTASSIUM SERPL-SCNC: 4.4 MMOL/L (ref 3.5–5.2)
PROT SERPL-MCNC: 6.7 G/DL (ref 6–8.5)
PSA SERPL-MCNC: 1 NG/ML (ref 0–4)
RBC # BLD AUTO: 5.02 X10E6/UL (ref 4.14–5.8)
REFLEX CRITERIA: NORMAL
SODIUM SERPL-SCNC: 141 MMOL/L (ref 134–144)
TRIGL SERPL-MCNC: 59 MG/DL (ref 0–149)
TSH SERPL DL<=0.005 MIU/L-ACNC: 3.54 UIU/ML (ref 0.45–4.5)
VLDLC SERPL CALC-MCNC: 12 MG/DL (ref 5–40)
WBC # BLD AUTO: 4.3 X10E3/UL (ref 3.4–10.8)

## 2022-11-30 PROBLEM — N17.0 ACUTE KIDNEY FAILURE WITH TUBULAR NECROSIS (HCC): Status: ACTIVE | Noted: 2022-11-30

## 2022-11-30 PROBLEM — E86.0 DEHYDRATION: Status: ACTIVE | Noted: 2022-11-30

## 2022-11-30 PROBLEM — J06.9 2019-NCOV ACUTE RESPIRATORY DISEASE: Status: ACTIVE | Noted: 2022-11-30

## 2022-11-30 PROBLEM — M62.82 RHABDOMYOLYSIS: Status: ACTIVE | Noted: 2022-11-30

## 2022-11-30 PROBLEM — S83.249A TEAR OF MEDIAL MENISCUS OF KNEE: Status: ACTIVE | Noted: 2022-11-30

## 2022-11-30 PROBLEM — U07.1 2019-NCOV ACUTE RESPIRATORY DISEASE: Status: ACTIVE | Noted: 2022-11-30

## 2022-11-30 PROBLEM — R73.9 HYPERGLYCEMIA, UNSPECIFIED: Status: ACTIVE | Noted: 2022-11-30

## 2022-11-30 PROBLEM — J12.89 OTHER VIRAL PNEUMONIA: Status: ACTIVE | Noted: 2022-11-30

## 2022-12-21 PROBLEM — R39.11 HESITANCY: Status: RESOLVED | Noted: 2021-12-08 | Resolved: 2022-12-21

## 2022-12-21 PROBLEM — Z12.5 PROSTATE CANCER SCREENING: Status: ACTIVE | Noted: 2022-12-21

## 2022-12-30 PROBLEM — E86.0 DEHYDRATION: Status: RESOLVED | Noted: 2022-11-30 | Resolved: 2022-12-30

## 2023-01-04 ENCOUNTER — OFFICE VISIT (OUTPATIENT)
Dept: UROLOGY | Age: 48
End: 2023-01-04
Payer: MEDICAID

## 2023-01-04 VITALS — BODY MASS INDEX: 36.82 KG/M2 | WEIGHT: 263 LBS | HEIGHT: 71 IN

## 2023-01-04 DIAGNOSIS — Z12.5 PROSTATE CANCER SCREENING: ICD-10-CM

## 2023-01-04 DIAGNOSIS — Z12.11 COLON CANCER SCREENING: ICD-10-CM

## 2023-01-04 DIAGNOSIS — N40.1 BENIGN PROSTATIC HYPERPLASIA WITH LOWER URINARY TRACT SYMPTOMS, SYMPTOM DETAILS UNSPECIFIED: Primary | ICD-10-CM

## 2023-01-04 PROBLEM — N41.1 CHRONIC PROSTATITIS: Status: RESOLVED | Noted: 2020-09-24 | Resolved: 2023-01-04

## 2023-01-04 NOTE — PROGRESS NOTES
HISTORY OF PRESENT ILLNESS  Travis Silverman. is a 56 y.o. male. has a past medical history of Atypical chest pain (9/24/2020), Cardiovascular stress test abnormal (9/24/2020), Chronic kidney disease, stage 2 (mild) (9/24/2020), Chronic prostatitis (9/24/2020), COVID-19, Dizziness, Essential hypertension (9/24/2020), Kidney disease, chronic, stage II (GFR 60-89 ml/min), Leukopenia (9/24/2020), Lumbosacral radiculopathy (9/24/2020), Right knee pain (9/24/2020), Snoring (9/24/2020), Stomach ulcer, and Thyroid activity decreased (9/24/2020). has a past surgical history that includes hx wisdom teeth extraction (2014) and hx orthopaedic (Right). Chief Complaint   Patient presents with    Follow-up    Urinary Retention    Benign Prostatic Hypertrophy     HPI  Chronic Conditions Addressed Today       1. Benign prostatic hyperplasia with lower urinary tract symptoms - Primary     Overview      BPH with some hesitancy; possible component of prostatitis. Current Assessment & Plan       He started tamsulosin with effect. He does note anejaculation. He is ok with that. 2. Prostate cancer screening     Overview      2021: 0.9  11/4/2022: 1.0          Current Assessment & Plan       PSA not concerning. Exam benign          Acute Diagnoses Addressed Today       Colon cancer screening        No blood or GI sx.   Due for colonoscopy        Relevant Orders        REFERRAL TO GASTROENTEROLOGY            Past Medical History:    PMHx (including negatives):  has a past medical history of Atypical chest pain (9/24/2020), Cardiovascular stress test abnormal (9/24/2020), Chronic kidney disease, stage 2 (mild) (9/24/2020), Chronic prostatitis (9/24/2020), COVID-19, Dizziness, Essential hypertension (9/24/2020), Kidney disease, chronic, stage II (GFR 60-89 ml/min), Leukopenia (9/24/2020), Lumbosacral radiculopathy (9/24/2020), Right knee pain (9/24/2020), Snoring (9/24/2020), Stomach ulcer, and Thyroid activity decreased (9/24/2020). PSurgHx:  has a past surgical history that includes hx wisdom teeth extraction (2014) and hx orthopaedic (Right). PSocHx:  reports that he quit smoking about 14 years ago. His smoking use included cigarettes. He has never used smokeless tobacco. He reports that he does not currently use alcohol. He reports that he does not currently use drugs after having used the following drugs: Cocaine and Marijuana. FamilyHX:   Family History   Problem Relation Age of Onset    Hypertension Mother     Diabetes Father         insulin dependent     Hypertension Father     Stroke Paternal Aunt 80    Cancer Paternal Uncle         liver cancer (heavy drinker)    Cancer Maternal Grandfather         throat cancer      ROS  Physical Exam  Constitutional:       General: He is not in acute distress. Appearance: Normal appearance. HENT:      Head: Normocephalic and atraumatic. Nose: Nose normal.   Eyes:      Extraocular Movements: Extraocular movements intact. Conjunctiva/sclera: Conjunctivae normal.   Pulmonary:      Effort: Pulmonary effort is normal. No respiratory distress. Abdominal:      Palpations: There is no mass. Hernia: No hernia is present. Genitourinary:     Penis: Normal. No phimosis, hypospadias, tenderness or swelling. Testes: Normal.         Right: Mass, tenderness or swelling not present. Right testis is descended. Left: Mass, tenderness or swelling not present. Left testis is descended. Epididymis:      Right: Not inflamed or enlarged. No tenderness. Left: Not inflamed or enlarged. No tenderness. Prostate: Enlarged (1+, benign). Not tender and no nodules present. Neurological:      Mental Status: He is alert and oriented to person, place, and time.    Psychiatric:         Mood and Affect: Mood normal.         Behavior: Behavior normal.     Allergies   Allergen Reactions    Vicodin [Hydrocodone-Acetaminophen] Other (comments) hallucinations     Current Outpatient Medications   Medication Sig Dispense Refill    gabapentin 10 % cmap 300 mg, PO, daily, 0 Refills      diclofenac (VOLTAREN) 1 % gel APPLY TOPICALLY 4 TIMES A DAY      metoprolol succinate (TOPROL-XL) 25 mg XL tablet Take 1 Tablet by mouth nightly. Indications: high blood pressure 90 Tablet 1    tamsulosin (Flomax) 0.4 mg capsule Take 1 Capsule by mouth daily. Indications: enlarged prostate with urination problem 90 Capsule 1    meloxicam (MOBIC) 15 mg tablet Take 1 Tablet by mouth daily. Indications: joint damage causing pain and loss of function 90 Tablet 1    pantoprazole (PROTONIX) 40 mg tablet Take 1 Tablet by mouth daily. 90 Tablet 1    esomeprazole (NEXIUM) 40 mg granules for oral suspension Take 40 mg by mouth daily. Indications: gastroesophageal reflux disease 90 Packet 1    amLODIPine (NORVASC) 5 mg tablet TAKE 1 TABLET BY MOUTH EVERY DAY FOR BLOOD PRESSURE 30 Tablet 2    levothyroxine (SYNTHROID) 137 mcg tablet TAKE 1 TABLET BY MOUTH ONCE DAILY BEFORE BREAKFAST FOR LOW THYROID HORMONE 90 Tablet 1    famotidine (PEPCID) 20 mg tablet TAKE 1 TABLET BY MOUTH EVERY DAY AT NIGHT 90 Tablet 1    ergocalciferol (ERGOCALCIFEROL) 1,250 mcg (50,000 unit) capsule TAKE 1 CAPSULE BY MOUTH ONE TIME PER WEEK INDICATIONS: VITAMIN D DEFICIENCY (HIGH DOSE THERAPY) 12 Capsule 0    lisinopriL (PRINIVIL, ZESTRIL) 40 mg tablet Take 1 Tablet by mouth daily.  90 Tablet 3    cyanocobalamin (VITAMIN B12) 500 mcg tablet TAKE 1 TABLET BY MOUTH ONCE DAILY 90 Tablet 1      Results for orders placed or performed in visit on 11/04/22   CBC WITH AUTOMATED DIFF   Result Value Ref Range    WBC 4.3 3.4 - 10.8 x10E3/uL    RBC 5.02 4.14 - 5.80 x10E6/uL    HGB 14.7 13.0 - 17.7 g/dL    HCT 44.6 37.5 - 51.0 %    MCV 89 79 - 97 fL    MCH 29.3 26.6 - 33.0 pg    MCHC 33.0 31.5 - 35.7 g/dL    RDW 13.3 11.6 - 15.4 %    PLATELET 464 074 - 245 x10E3/uL    NEUTROPHILS 63 Not Estab. %    Lymphocytes 27 Not Estab. % MONOCYTES 6 Not Estab. %    EOSINOPHILS 3 Not Estab. %    BASOPHILS 1 Not Estab. %    ABS. NEUTROPHILS 2.7 1.4 - 7.0 x10E3/uL    Abs Lymphocytes 1.2 0.7 - 3.1 x10E3/uL    ABS. MONOCYTES 0.3 0.1 - 0.9 x10E3/uL    ABS. EOSINOPHILS 0.1 0.0 - 0.4 x10E3/uL    ABS. BASOPHILS 0.1 0.0 - 0.2 x10E3/uL    IMMATURE GRANULOCYTES 0 Not Estab. %    ABS. IMM. GRANS. 0.0 0.0 - 0.1 [id number]/FE   METABOLIC PANEL, COMPREHENSIVE   Result Value Ref Range    Glucose 98 70 - 99 mg/dL    BUN 15 6 - 24 mg/dL    Creatinine 1.38 (H) 0.76 - 1.27 mg/dL    eGFR 64 >59 mL/min/1.73    BUN/Creatinine ratio 11 9 - 20    Sodium 141 134 - 144 mmol/L    Potassium 4.4 3.5 - 5.2 mmol/L    Chloride 102 96 - 106 mmol/L    CO2 26 20 - 29 mmol/L    Calcium 9.7 8.7 - 10.2 mg/dL    Protein, total 6.7 6.0 - 8.5 g/dL    Albumin 4.7 4.0 - 5.0 g/dL    GLOBULIN, TOTAL 2.0 1.5 - 4.5 g/dL    A-G Ratio 2.4 (H) 1.2 - 2.2    Bilirubin, total 0.6 0.0 - 1.2 mg/dL    Alk. phosphatase 76 44 - 121 IU/L    AST (SGOT) 25 0 - 40 IU/L    ALT (SGPT) 23 0 - 44 IU/L   LIPID PANEL   Result Value Ref Range    Cholesterol, total 157 100 - 199 mg/dL    Triglyceride 59 0 - 149 mg/dL    HDL Cholesterol 44 >39 mg/dL    VLDL, calculated 12 5 - 40 mg/dL    LDL, calculated 101 (H) 0 - 99 mg/dL   MICROALBUMIN, UR, RAND W/ MICROALB/CREAT RATIO   Result Value Ref Range    Creatinine, urine random 171.1 Not Estab. mg/dL    Microalbumin, urine 15.3 Not Estab. ug/mL    Microalb/Creat ratio (ug/mg creat.) 9 0 - 29 mg/g creat   PSA W/ REFLX FREE PSA   Result Value Ref Range    Prostate Specific Ag 1.0 0.0 - 4.0 ng/mL    Reflex Criteria Comment    THYROID CASCADE PROFILE   Result Value Ref Range    TSH 3.540 0.450 - 4.500 uIU/mL       ASSESSMENT and PLAN  Diagnoses and all orders for this visit:    1. Benign prostatic hyperplasia with lower urinary tract symptoms, symptom details unspecified  Assessment & Plan:   He started tamsulosin with effect. He does note anejaculation. He is ok with that. 2. Prostate cancer screening  Assessment & Plan:   PSA not concerning. Exam benign      3. Colon cancer screening  Comments:  No blood or GI sx.   Due for colonoscopy  Orders:  -     REFERRAL TO [facility]  Bryan Villegas MD

## 2023-01-04 NOTE — LETTER
1/4/2023    Patient: Winifred Siddiqui. YOB: 1975   Date of Visit: 1/6/5141     Price Cole NP  [facility] [phone] [facility]  [address] [place]    Dear Price Cole NP,      Thank you for referring Mr. Olesya Cabrera to [facility] for evaluation. My notes for this consultation are attached. If you have questions, please do not hesitate to call me. I look forward to following your patient along with you.       Sincerely,    Cindy Sierra MD

## 2023-01-04 NOTE — PROGRESS NOTES
Chief Complaint   Patient presents with    Follow-up    Urinary Retention    Benign Prostatic Hypertrophy     1. Have you been to the ER, urgent care clinic since your last visit? Hospitalized since your last visit? No    2. Have you seen or consulted any other health care providers outside of the [address] [place] [address] since your last visit? Include any pap smears or colon screening.  No  Visit Vitals  Ht 5' 11\" (1.803 m)   Wt 263 lb (119.3 kg)   BMI 36.68 kg/m²

## 2023-01-29 DIAGNOSIS — I10 ESSENTIAL (PRIMARY) HYPERTENSION: ICD-10-CM

## 2023-01-30 RX ORDER — LISINOPRIL 40 MG/1
TABLET ORAL
Qty: 90 TABLET | Refills: 3 | Status: SHIPPED | OUTPATIENT
Start: 2023-01-30

## 2023-02-03 PROBLEM — Z12.5 PROSTATE CANCER SCREENING: Status: RESOLVED | Noted: 2022-12-21 | Resolved: 2023-02-03

## 2023-02-07 ENCOUNTER — OFFICE VISIT (OUTPATIENT)
Dept: FAMILY MEDICINE CLINIC | Age: 48
End: 2023-02-07
Payer: MEDICAID

## 2023-02-07 VITALS
OXYGEN SATURATION: 98 % | TEMPERATURE: 98.2 F | SYSTOLIC BLOOD PRESSURE: 138 MMHG | HEIGHT: 71 IN | WEIGHT: 277.4 LBS | DIASTOLIC BLOOD PRESSURE: 88 MMHG | HEART RATE: 65 BPM | BODY MASS INDEX: 38.84 KG/M2

## 2023-02-07 DIAGNOSIS — E03.9 ACQUIRED HYPOTHYROIDISM: ICD-10-CM

## 2023-02-07 DIAGNOSIS — I10 ESSENTIAL HYPERTENSION: Primary | ICD-10-CM

## 2023-02-07 RX ORDER — LEVOTHYROXINE SODIUM 137 UG/1
137 TABLET ORAL
Qty: 90 TABLET | Refills: 3 | Status: SHIPPED | OUTPATIENT
Start: 2023-02-07

## 2023-02-07 RX ORDER — AMLODIPINE BESYLATE 5 MG/1
5 TABLET ORAL DAILY
Qty: 90 TABLET | Refills: 3 | Status: SHIPPED | OUTPATIENT
Start: 2023-02-07

## 2023-02-07 NOTE — PROGRESS NOTES
Subjective:   Yoni Kunz is a 56 y.o. male  established here with complaints of   Chief Complaint   Patient presents with    Hypertension    Follow-up    Knee Pain      Reports just started taking BP med today, ran out a week ago. Patient reports blood pressures at home most frequently not checked. Patient has symptoms of none of the following; no chest pain, shortness of breath, weakness, orthostatic hypotension, cough, myalgias, rash, headaches, weight gain, leg swelling, palpitations, slow heart rate, fatigue, depression. Patient reports good compliance with medications, no side effects from medications noted. Current treatments include diet modification, weight loss.      Patient Active Problem List   Diagnosis Code    Thyroid activity decreased E03.9    Right knee pain M25.561    Atypical chest pain R07.89    Cardiovascular stress test abnormal R94.39    Chronic kidney disease, stage 2 (mild) N18.2    Essential hypertension I10    Leukopenia D72.819    Lumbosacral radiculopathy M54.17    Snoring R06.83    Severe obesity (HCC) E66.01    Benign prostatic hyperplasia with lower urinary tract symptoms N40.1    2019-nCoV acute respiratory disease U07.1, J06.9    Acute kidney failure with tubular necrosis (HCC) N17.0    Dehydration E86.0    Hyperglycemia, unspecified R73.9    Other viral pneumonia J12.89    Rhabdomyolysis M62.82    Tear of medial meniscus of knee S83.249A    Prostate cancer screening Z12.5     Patient Active Problem List    Diagnosis Date Noted    Prostate cancer screening 12/21/2022 2019-nCoV acute respiratory disease 11/30/2022    Acute kidney failure with tubular necrosis ([address] 11/30/2022    Dehydration 11/30/2022    Hyperglycemia, unspecified 11/30/2022    Other viral pneumonia 11/30/2022    Rhabdomyolysis 11/30/2022    Tear of medial meniscus of knee 11/30/2022    Benign prostatic hyperplasia with lower urinary tract symptoms 12/08/2021    Severe obesity ([address] 11/06/2020    Thyroid activity decreased 09/24/2020    Right knee pain 09/24/2020    Atypical chest pain 09/24/2020    Cardiovascular stress test abnormal 09/24/2020    Chronic kidney disease, stage 2 (mild) 09/24/2020    Essential hypertension 09/24/2020    Leukopenia 09/24/2020    Lumbosacral radiculopathy 09/24/2020    Snoring 09/24/2020     Current Outpatient Medications   Medication Sig Dispense Refill    levothyroxine (SYNTHROID) 137 mcg tablet Take 1 Tablet by mouth Daily (before breakfast). Indications: a condition with low thyroid hormone levels 90 Tablet 3    amLODIPine (NORVASC) 5 mg tablet Take 1 Tablet by mouth daily. Indications: high blood pressure 90 Tablet 3    lisinopriL (PRINIVIL, ZESTRIL) 40 mg tablet TAKE 1 TABLET BY MOUTH EVERY DAY 90 Tablet 3    diclofenac (VOLTAREN) 1 % gel APPLY TOPICALLY 4 TIMES A DAY      metoprolol succinate (TOPROL-XL) 25 mg XL tablet Take 1 Tablet by mouth nightly. Indications: high blood pressure 90 Tablet 1    pantoprazole (PROTONIX) 40 mg tablet Take 1 Tablet by mouth daily. 90 Tablet 1    esomeprazole (NEXIUM) 40 mg granules for oral suspension Take 40 mg by mouth daily.  Indications: gastroesophageal reflux disease 90 Packet 1    famotidine (PEPCID) 20 mg tablet TAKE 1 TABLET BY MOUTH EVERY DAY AT NIGHT 90 Tablet 1    cyanocobalamin (VITAMIN B12) 500 mcg tablet TAKE 1 TABLET BY MOUTH ONCE DAILY 90 Tablet 1     Allergies   Allergen Reactions    Vicodin [Hydrocodone-Acetaminophen] Other (comments)     hallucinations     Past Medical History:   Diagnosis Date    Atypical chest pain 9/24/2020    Cardiovascular stress test abnormal 9/24/2020    Chronic kidney disease, stage 2 (mild) 9/24/2020    Chronic prostatitis 9/24/2020    COVID-19     Dizziness     Essential hypertension 9/24/2020    Kidney disease, chronic, stage II (GFR 60-89 ml/min)     Leukopenia 9/24/2020    Lumbosacral radiculopathy 9/24/2020    Right knee pain 9/24/2020    Snoring 9/24/2020    Stomach ulcer     Thyroid activity decreased 2020     Past Surgical History:   Procedure Laterality Date    HX ORTHOPAEDIC Right     meniscus repair    HX WISDOM TEETH EXTRACTION       Family History   Problem Relation Age of Onset    Hypertension Mother     Diabetes Father         insulin dependent     Hypertension Father     Stroke Paternal Aunt 80    Cancer Paternal Uncle         liver cancer (heavy drinker)    Cancer Maternal Grandfather         throat cancer      Social History     Tobacco Use    Smoking status: Former     Years: 12.00     Types: Cigarettes     Quit date:      Years since quittin.1    Smokeless tobacco: Never   Substance Use Topics    Alcohol use: Not Currently      Review of Systems    A comprehensive review of systems was negative except for that written in the HPI. Objective:     Visit Vitals  /88 (BP 1 Location: Right upper arm, BP Patient Position: Sitting, BP Cuff Size: Large adult)   Pulse 65   Temp 98.2 °F (36.8 °C) (Temporal)   Ht 5' 11\" (1.803 m)   Wt 277 lb 6.4 oz (125.8 kg)   SpO2 98%   BMI 38.69 kg/m²     General:  Alert, cooperative, no distress, appears stated age. Head:  Normocephalic, without obvious abnormality, atraumatic. Eyes:  Conjunctivae/corneas clear. PERRL, EOMs intact. Fundi benign   Ears:  Normal TMs and external ear canals both ears. Nose: Nares normal. Septum midline. Mucosa normal. No drainage or sinus tenderness. Throat: Lips, mucosa, and tongue normal. Teeth and gums normal.   Neck: Supple, symmetrical, trachea midline, no adenopathy, thyroid: no enlargement/tenderness/nodules, no carotid bruit and no JVD. Back:   Symmetric, no curvature. ROM normal. No CVA tenderness. Lungs:   Clear to auscultation bilaterally. Chest wall:  No tenderness or deformity. Heart:  Regular rate and rhythm, S1, S2 normal, no murmur, click, rub or gallop. Abdomen:   Soft, non-tender. Bowel sounds normal. No masses,  No organomegaly.    Genitalia:  Normal male without lesion, discharge or tenderness. Rectal:  Normal tone, normal prostate, no masses or tenderness  Guaiac negative stool. Extremities: Extremities normal, atraumatic, no cyanosis or edema. Pulses: 2+ and symmetric all extremities. Skin: Skin color, texture, turgor normal. No rashes or lesions   Lymph nodes: Cervical, supraclavicular, and axillary nodes normal.   Neurologic: CNII-XII intact. Normal strength, sensation and reflexes throughout. Assessment/Plan:       ICD-10-CM ICD-9-CM    1. Essential hypertension  I10 401.9 amLODIPine (NORVASC) 5 mg tablet      CBC WITH AUTOMATED DIFF      METABOLIC PANEL, COMPREHENSIVE      LIPID PANEL      THYROID CASCADE PROFILE      MICROALBUMIN, UR, RAND W/ MICROALB/CREAT RATIO      2. Acquired hypothyroidism  E03.9 244.9 levothyroxine (SYNTHROID) 137 mcg tablet      LIPID PANEL      THYROID CASCADE PROFILE        Follow-up and Dispositions    Return in about 6 months (around 8/7/2023) for HTN, Hyperlipidemia, Lab review, Hypothyroid. Resume blood pressure medicine as directed  Follow-up with nurses visit in 2 weeks to reevaluate blood pressure  No make any changes to treatment plan    Issues discussed include maintaining a proper diet, maintaining medication compliance, medication side effects, future potential medication changes if needed. Treatment plan: Continue current medications, labwork from today discussed and reviewed, future labwork ordered, A1C, lipids check, patient up to date on maintence and screening exams. Follow-up and Dispositions    Return in about 6 months (around 8/7/2023) for HTN, Hyperlipidemia, Lab review, Hypothyroid. On this date 02/07/2023 I have spent 31 minutes reviewing previous notes, test results and face to face (virtual) with the patient discussing the diagnosis and importance of compliance with the treatment plan as well as documenting on the day of the visit.    Aspects of this note may have been generated using voice recognition software. Despite editing, there may be some syntax errors.     Machelle Goss, NP

## 2023-02-07 NOTE — PROGRESS NOTES
1. Have you been to the ER, urgent care clinic since your last visit? Hospitalized since your last visit? No    2. Have you seen or consulted any other health care providers outside of the [address] [place] [address] since your last visit? Include any pap smears or colon screening.  No    Chief Complaint   Patient presents with    Hypertension    Follow-up    Knee Pain     Visit Vitals  BP (!) 140/90 (BP 1 Location: Left upper arm, BP Patient Position: Sitting, BP Cuff Size: Large adult)   Pulse 65   Temp 98.2 °F (36.8 °C) (Temporal)   Ht 5' 11\" (1.803 m)   Wt 277 lb 6.4 oz (125.8 kg)   SpO2 98%   BMI 38.69 kg/m²       Visit Vitals  BP (!) 138/90 (BP 1 Location: Right upper arm, BP Patient Position: Sitting, BP Cuff Size: Large adult)   Pulse 65   Temp 98.2 °F (36.8 °C) (Temporal)   Ht 5' 11\" (1.803 m)   Wt 277 lb 6.4 oz (125.8 kg)   SpO2 98%   BMI 38.69 kg/m²

## 2023-02-21 ENCOUNTER — CLINICAL SUPPORT (OUTPATIENT)
Dept: FAMILY MEDICINE CLINIC | Age: 48
End: 2023-02-21

## 2023-02-21 VITALS — SYSTOLIC BLOOD PRESSURE: 143 MMHG | DIASTOLIC BLOOD PRESSURE: 93 MMHG

## 2023-02-21 DIAGNOSIS — I10 ESSENTIAL HYPERTENSION: Primary | ICD-10-CM

## 2023-02-27 ENCOUNTER — HOSPITAL ENCOUNTER (OUTPATIENT)
Age: 48
Setting detail: OUTPATIENT SURGERY
Discharge: HOME | End: 2023-02-27
Attending: INTERNAL MEDICINE | Admitting: INTERNAL MEDICINE
Payer: MEDICAID

## 2023-02-27 ENCOUNTER — ANESTHESIA EVENT (OUTPATIENT)
Dept: ENDOSCOPY | Age: 48
End: 2023-02-27
Payer: MEDICAID

## 2023-02-27 ENCOUNTER — ANESTHESIA (OUTPATIENT)
Dept: ENDOSCOPY | Age: 48
End: 2023-02-27
Payer: MEDICAID

## 2023-02-27 ENCOUNTER — APPOINTMENT (OUTPATIENT)
Dept: ENDOSCOPY | Age: 48
End: 2023-02-27
Attending: INTERNAL MEDICINE
Payer: MEDICAID

## 2023-02-27 VITALS
OXYGEN SATURATION: 99 % | TEMPERATURE: 97.6 F | RESPIRATION RATE: 18 BRPM | SYSTOLIC BLOOD PRESSURE: 153 MMHG | HEART RATE: 73 BPM | WEIGHT: 269 LBS | BODY MASS INDEX: 37.52 KG/M2 | DIASTOLIC BLOOD PRESSURE: 101 MMHG

## 2023-02-27 PROCEDURE — 76040000009: Performed by: INTERNAL MEDICINE

## 2023-02-27 PROCEDURE — 74011250636 HC RX REV CODE- 250/636: Performed by: NURSE ANESTHETIST, CERTIFIED REGISTERED

## 2023-02-27 PROCEDURE — 2709999900 HC NON-CHARGEABLE SUPPLY: Performed by: INTERNAL MEDICINE

## 2023-02-27 PROCEDURE — 74011000250 HC RX REV CODE- 250: Performed by: NURSE ANESTHETIST, CERTIFIED REGISTERED

## 2023-02-27 PROCEDURE — 74011250636 HC RX REV CODE- 250/636: Performed by: INTERNAL MEDICINE

## 2023-02-27 PROCEDURE — 76060000034 HC ANESTHESIA 1.5 TO 2 HR: Performed by: INTERNAL MEDICINE

## 2023-02-27 RX ORDER — PROPOFOL 10 MG/ML
INJECTION, EMULSION INTRAVENOUS
Status: COMPLETED
Start: 2023-02-27 | End: 2023-02-27

## 2023-02-27 RX ORDER — SODIUM CHLORIDE FOR INHALATION 0.9 %
VIAL, NEBULIZER (ML) INHALATION
Status: DISCONTINUED
Start: 2023-02-27 | End: 2023-02-27 | Stop reason: HOSPADM

## 2023-02-27 RX ORDER — PROPOFOL 10 MG/ML
INJECTION, EMULSION INTRAVENOUS AS NEEDED
Status: DISCONTINUED | OUTPATIENT
Start: 2023-02-27 | End: 2023-02-27 | Stop reason: HOSPADM

## 2023-02-27 RX ORDER — SODIUM CHLORIDE, SODIUM LACTATE, POTASSIUM CHLORIDE, CALCIUM CHLORIDE 600; 310; 30; 20 MG/100ML; MG/100ML; MG/100ML; MG/100ML
INJECTION, SOLUTION INTRAVENOUS
Status: DISCONTINUED | OUTPATIENT
Start: 2023-02-27 | End: 2023-02-27 | Stop reason: HOSPADM

## 2023-02-27 RX ORDER — LIDOCAINE HYDROCHLORIDE 20 MG/ML
INJECTION, SOLUTION EPIDURAL; INFILTRATION; INTRACAUDAL; PERINEURAL AS NEEDED
Status: DISCONTINUED | OUTPATIENT
Start: 2023-02-27 | End: 2023-02-27 | Stop reason: HOSPADM

## 2023-02-27 RX ORDER — SODIUM CHLORIDE 9 MG/ML
25 INJECTION, SOLUTION INTRAVENOUS CONTINUOUS
Status: DISCONTINUED | OUTPATIENT
Start: 2023-02-27 | End: 2023-02-27 | Stop reason: HOSPADM

## 2023-02-27 RX ADMIN — PROPOFOL 100 MG: 10 INJECTION, EMULSION INTRAVENOUS at 10:41

## 2023-02-27 RX ADMIN — SODIUM CHLORIDE, POTASSIUM CHLORIDE, SODIUM LACTATE AND CALCIUM CHLORIDE: 600; 310; 30; 20 INJECTION, SOLUTION INTRAVENOUS at 10:27

## 2023-02-27 RX ADMIN — LIDOCAINE HYDROCHLORIDE 40 MG: 20 INJECTION, SOLUTION EPIDURAL; INFILTRATION; INTRACAUDAL; PERINEURAL at 10:38

## 2023-02-27 RX ADMIN — PROPOFOL 100 MG: 10 INJECTION, EMULSION INTRAVENOUS at 10:38

## 2023-02-27 RX ADMIN — SODIUM CHLORIDE 25 ML/HR: 9 INJECTION, SOLUTION INTRAVENOUS at 07:41

## 2023-02-27 NOTE — ANESTHESIA PREPROCEDURE EVALUATION
Relevant Problems   RESPIRATORY SYSTEM   (+) Other viral pneumonia      CARDIOVASCULAR   (+) Essential hypertension      RENAL FAILURE   (+) Acute kidney failure with tubular necrosis (HCC)   (+) Chronic kidney disease, stage 2 (mild)      ENDOCRINE   (+) Severe obesity (HCC)   (+) Thyroid activity decreased       Anesthetic History   No history of anesthetic complications            Review of Systems / Medical History  Patient summary reviewed, nursing notes reviewed and pertinent labs reviewed    Pulmonary  Within defined limits                 Neuro/Psych   Within defined limits           Cardiovascular    Hypertension                   GI/Hepatic/Renal     GERD    Renal disease (Creatinine 1.38): CRI  PUD     Endo/Other      Hypothyroidism  Morbid obesity     Other Findings   Comments: Procedure Information    Case: [id number] Date/Time: 02/27/23 0900  Procedure: COLONOSCOPY  Anesthesia type: MAC  Pre-op diagnosis: SCREENING  Location: [facility] ENDO 02 / [facility] ENDOSCOPY  Providers: Jw Saldana MD    Medical History  Thyroid activity decreased  Right knee pain  Atypical chest pain  Cardiovascular stress test abnormal  Chronic kidney disease, stage 2 (mild)  Chronic prostatitis  Essential hypertension  Leukopenia  Lumbosacral radiculopathy  Snoring  Dizziness  Stomach ulcer  COVID-19  Kidney disease, chronic, stage II (GFR 60-89 ml/min)         Physical Exam    Airway  Mallampati: III  TM Distance: 4 - 6 cm  Neck ROM: normal range of motion   Mouth opening: Normal     Cardiovascular    Rhythm: regular  Rate: normal         Dental  No notable dental hx       Pulmonary  Breath sounds clear to auscultation               Abdominal  GI exam deferred       Other Findings            Anesthetic Plan    ASA: 3  Anesthesia type: total IV anesthesia and MAC    Monitoring Plan: Continuous noninvasive hemodynamic monitoring      Induction: Intravenous  Anesthetic plan and risks discussed with: Patient

## 2023-02-27 NOTE — PERIOP NOTES
Up in room with no difficulty. No complaints at this time. Discharge instructions reviewed with pt and girlfriend, verbalize understanding. Dr. Jose Rocha in to see pt. Discharged via wheelchair to private vehicle.

## 2023-02-27 NOTE — ANESTHESIA POSTPROCEDURE EVALUATION
Procedure(s):  COLONOSCOPY.    total IV anesthesia, MAC    Anesthesia Post Evaluation      Multimodal analgesia: multimodal analgesia not used between 6 hours prior to anesthesia start to PACU discharge  Patient location during evaluation: bedside  Patient participation: complete - patient participated  Level of consciousness: lethargic  Pain score: 0  Pain management: adequate  Airway patency: patent  Anesthetic complications: no  Cardiovascular status: acceptable  Respiratory status: acceptable  Hydration status: acceptable  Post anesthesia nausea and vomiting:  none      INITIAL Post-op Vital signs:   Vitals Value Taken Time   /86 02/27/23 1047   Temp     Pulse 57 02/27/23 1047   Resp 14 02/27/23 1047   SpO2 100 % 02/27/23 1047

## 2023-03-13 DIAGNOSIS — K21.9 GASTROESOPHAGEAL REFLUX DISEASE WITHOUT ESOPHAGITIS: ICD-10-CM

## 2023-03-13 RX ORDER — FAMOTIDINE 20 MG/1
20 TABLET, FILM COATED ORAL
Qty: 90 TABLET | Refills: 1 | Status: SHIPPED | OUTPATIENT
Start: 2023-03-13

## 2023-03-13 NOTE — TELEPHONE ENCOUNTER
Last OV:02/07/2023  Next OV:08/08/2023  Last Refill:06/30/2022  Last (labs):11/04/2022    -*Insert any notes here*  Requested Prescriptions     Pending Prescriptions Disp Refills    famotidine (PEPCID) 20 mg tablet 90 Tablet 1     Sig: Take 1 Tablet by mouth nightly.

## 2023-05-26 RX ORDER — PANTOPRAZOLE SODIUM 40 MG/1
40 TABLET, DELAYED RELEASE ORAL DAILY
COMMUNITY
Start: 2022-10-21

## 2023-05-26 RX ORDER — ESOMEPRAZOLE MAGNESIUM 40 MG/1
40 FOR SUSPENSION ORAL DAILY
COMMUNITY
Start: 2022-09-12

## 2023-05-26 RX ORDER — LEVOTHYROXINE SODIUM 137 UG/1
137 TABLET ORAL
COMMUNITY
Start: 2023-02-07

## 2023-05-26 RX ORDER — AMLODIPINE BESYLATE 5 MG/1
5 TABLET ORAL DAILY
COMMUNITY
Start: 2023-02-07

## 2023-05-26 RX ORDER — METOPROLOL SUCCINATE 25 MG/1
25 TABLET, EXTENDED RELEASE ORAL
COMMUNITY
Start: 2022-11-04 | End: 2023-06-19

## 2023-05-26 RX ORDER — LISINOPRIL 40 MG/1
1 TABLET ORAL DAILY
COMMUNITY
Start: 2023-01-30

## 2023-05-26 RX ORDER — FAMOTIDINE 20 MG/1
1 TABLET, FILM COATED ORAL NIGHTLY
COMMUNITY
Start: 2023-03-13

## 2023-06-18 DIAGNOSIS — I10 ESSENTIAL (PRIMARY) HYPERTENSION: ICD-10-CM

## 2023-06-19 RX ORDER — METOPROLOL SUCCINATE 25 MG/1
TABLET, EXTENDED RELEASE ORAL
Qty: 30 TABLET | Refills: 5 | Status: SHIPPED | OUTPATIENT
Start: 2023-06-19

## 2023-06-19 NOTE — TELEPHONE ENCOUNTER
Requested Prescriptions     Pending Prescriptions Disp Refills    metoprolol succinate (TOPROL XL) 25 MG extended release tablet [Pharmacy Med Name: METOPROLOL SUCC ER 25 MG TAB] 30 tablet 5     Sig: TAKE 1 TABLET BY MOUTH NIGHTLY.  INDICATIONS: HIGH BLOOD PRESSURE          Last OV:2/7/23  Next OV:8/8/23  Last Refill:11/4/22  Qty 90  Refills 1

## 2023-08-08 ENCOUNTER — OFFICE VISIT (OUTPATIENT)
Facility: CLINIC | Age: 48
End: 2023-08-08
Payer: MEDICAID

## 2023-08-08 VITALS
HEART RATE: 60 BPM | OXYGEN SATURATION: 98 % | BODY MASS INDEX: 37.1 KG/M2 | TEMPERATURE: 97.8 F | SYSTOLIC BLOOD PRESSURE: 128 MMHG | WEIGHT: 265 LBS | HEIGHT: 71 IN | DIASTOLIC BLOOD PRESSURE: 90 MMHG

## 2023-08-08 DIAGNOSIS — Z11.4 SCREENING FOR HIV WITHOUT PRESENCE OF RISK FACTORS: ICD-10-CM

## 2023-08-08 DIAGNOSIS — E78.5 HYPERLIPIDEMIA, UNSPECIFIED HYPERLIPIDEMIA TYPE: ICD-10-CM

## 2023-08-08 DIAGNOSIS — E03.9 HYPOTHYROIDISM, UNSPECIFIED TYPE: ICD-10-CM

## 2023-08-08 DIAGNOSIS — I10 ESSENTIAL (PRIMARY) HYPERTENSION: Primary | ICD-10-CM

## 2023-08-08 PROCEDURE — 3080F DIAST BP >= 90 MM HG: CPT | Performed by: NURSE PRACTITIONER

## 2023-08-08 PROCEDURE — 99214 OFFICE O/P EST MOD 30 MIN: CPT | Performed by: NURSE PRACTITIONER

## 2023-08-08 PROCEDURE — 3074F SYST BP LT 130 MM HG: CPT | Performed by: NURSE PRACTITIONER

## 2023-08-08 RX ORDER — CLONIDINE 0.1 MG/24H
1 PATCH, EXTENDED RELEASE TRANSDERMAL WEEKLY
Qty: 12 PATCH | Refills: 3 | Status: SHIPPED | OUTPATIENT
Start: 2023-08-08

## 2023-09-25 DIAGNOSIS — K21.9 GASTRO-ESOPHAGEAL REFLUX DISEASE WITHOUT ESOPHAGITIS: ICD-10-CM

## 2023-09-25 NOTE — TELEPHONE ENCOUNTER
Requested Prescriptions     Pending Prescriptions Disp Refills    famotidine (PEPCID) 20 MG tablet [Pharmacy Med Name: FAMOTIDINE 20 MG TABLET] 90 tablet 1     Sig: TAKE 1 TABLET BY MOUTH NIGHTLY

## 2023-09-26 RX ORDER — FAMOTIDINE 20 MG/1
TABLET, FILM COATED ORAL NIGHTLY
Qty: 90 TABLET | Refills: 1 | Status: SHIPPED | OUTPATIENT
Start: 2023-09-26

## 2023-10-31 LAB
ALBUMIN SERPL-MCNC: 4.5 G/DL (ref 4.1–5.1)
ALBUMIN/CREAT UR: 5 MG/G CREAT (ref 0–29)
ALBUMIN/GLOB SERPL: 2 {RATIO} (ref 1.2–2.2)
ALP SERPL-CCNC: 74 IU/L (ref 44–121)
ALT SERPL-CCNC: 22 IU/L (ref 0–44)
AST SERPL-CCNC: 24 IU/L (ref 0–40)
BASOPHILS # BLD AUTO: 0.1 X10E3/UL (ref 0–0.2)
BASOPHILS NFR BLD AUTO: 1 %
BILIRUB SERPL-MCNC: 0.5 MG/DL (ref 0–1.2)
BUN SERPL-MCNC: 15 MG/DL (ref 6–24)
BUN/CREAT SERPL: 10 (ref 9–20)
CALCIUM SERPL-MCNC: 9.4 MG/DL (ref 8.7–10.2)
CHLORIDE SERPL-SCNC: 104 MMOL/L (ref 96–106)
CHOLEST SERPL-MCNC: 157 MG/DL (ref 100–199)
CO2 SERPL-SCNC: 26 MMOL/L (ref 20–29)
CREAT SERPL-MCNC: 1.55 MG/DL (ref 0.76–1.27)
CREAT UR-MCNC: 298.7 MG/DL
EGFRCR SERPLBLD CKD-EPI 2021: 55 ML/MIN/1.73
EOSINOPHIL # BLD AUTO: 0.2 X10E3/UL (ref 0–0.4)
EOSINOPHIL NFR BLD AUTO: 5 %
ERYTHROCYTE [DISTWIDTH] IN BLOOD BY AUTOMATED COUNT: 12.9 % (ref 11.6–15.4)
GLOBULIN SER CALC-MCNC: 2.3 G/DL (ref 1.5–4.5)
GLUCOSE SERPL-MCNC: 111 MG/DL (ref 70–99)
HCT VFR BLD AUTO: 45.8 % (ref 37.5–51)
HDLC SERPL-MCNC: 39 MG/DL
HGB BLD-MCNC: 15.4 G/DL (ref 13–17.7)
HIV 1+2 AB+HIV1 P24 AG SERPL QL IA: NON REACTIVE
IMM GRANULOCYTES # BLD AUTO: 0 X10E3/UL (ref 0–0.1)
IMM GRANULOCYTES NFR BLD AUTO: 0 %
LDLC SERPL CALC-MCNC: 103 MG/DL (ref 0–99)
LYMPHOCYTES # BLD AUTO: 1.2 X10E3/UL (ref 0.7–3.1)
LYMPHOCYTES NFR BLD AUTO: 31 %
MCH RBC QN AUTO: 30.1 PG (ref 26.6–33)
MCHC RBC AUTO-ENTMCNC: 33.6 G/DL (ref 31.5–35.7)
MCV RBC AUTO: 90 FL (ref 79–97)
MICROALBUMIN UR-MCNC: 15.2 UG/ML
MONOCYTES # BLD AUTO: 0.2 X10E3/UL (ref 0.1–0.9)
MONOCYTES NFR BLD AUTO: 6 %
NEUTROPHILS # BLD AUTO: 2.1 X10E3/UL (ref 1.4–7)
NEUTROPHILS NFR BLD AUTO: 57 %
PLATELET # BLD AUTO: 217 X10E3/UL (ref 150–450)
POTASSIUM SERPL-SCNC: 4.7 MMOL/L (ref 3.5–5.2)
PROT SERPL-MCNC: 6.8 G/DL (ref 6–8.5)
RBC # BLD AUTO: 5.12 X10E6/UL (ref 4.14–5.8)
SODIUM SERPL-SCNC: 141 MMOL/L (ref 134–144)
TRIGL SERPL-MCNC: 78 MG/DL (ref 0–149)
TSH SERPL DL<=0.005 MIU/L-ACNC: 2.06 UIU/ML (ref 0.45–4.5)
VLDLC SERPL CALC-MCNC: 15 MG/DL (ref 5–40)
WBC # BLD AUTO: 3.7 X10E3/UL (ref 3.4–10.8)

## 2023-11-02 ENCOUNTER — TELEPHONE (OUTPATIENT)
Facility: CLINIC | Age: 48
End: 2023-11-02

## 2023-11-02 NOTE — TELEPHONE ENCOUNTER
I spoke with the patient advised of lab results and providers recommendations. He stated that he saw [facility] about 6 months ago and will call and schedule.

## 2023-11-02 NOTE — TELEPHONE ENCOUNTER
----- Message from [address], APRN - CNP sent at 11/2/2023 11:12 AM EDT -----  Results have been reviewed and abnormal results noted. Kidney function slightly decreased, need to schedule appt with nephrology to evaluste.

## 2023-11-14 ENCOUNTER — OFFICE VISIT (OUTPATIENT)
Facility: CLINIC | Age: 48
End: 2023-11-14
Payer: MEDICAID

## 2023-11-14 VITALS
HEART RATE: 71 BPM | SYSTOLIC BLOOD PRESSURE: 138 MMHG | HEIGHT: 71 IN | WEIGHT: 265.8 LBS | TEMPERATURE: 98 F | OXYGEN SATURATION: 97 % | BODY MASS INDEX: 37.21 KG/M2 | DIASTOLIC BLOOD PRESSURE: 87 MMHG

## 2023-11-14 DIAGNOSIS — E03.9 HYPOTHYROIDISM, UNSPECIFIED TYPE: ICD-10-CM

## 2023-11-14 DIAGNOSIS — R73.9 HYPERGLYCEMIA, UNSPECIFIED: ICD-10-CM

## 2023-11-14 DIAGNOSIS — I10 ESSENTIAL HYPERTENSION: Primary | ICD-10-CM

## 2023-11-14 DIAGNOSIS — G47.30 SLEEP APNEA, UNSPECIFIED TYPE: ICD-10-CM

## 2023-11-14 PROCEDURE — 99214 OFFICE O/P EST MOD 30 MIN: CPT | Performed by: NURSE PRACTITIONER

## 2023-11-14 PROCEDURE — 3078F DIAST BP <80 MM HG: CPT | Performed by: NURSE PRACTITIONER

## 2023-11-14 PROCEDURE — 3074F SYST BP LT 130 MM HG: CPT | Performed by: NURSE PRACTITIONER

## 2023-11-14 ASSESSMENT — PATIENT HEALTH QUESTIONNAIRE - PHQ9
SUM OF ALL RESPONSES TO PHQ QUESTIONS 1-9: 2
2. FEELING DOWN, DEPRESSED OR HOPELESS: 1
SUM OF ALL RESPONSES TO PHQ QUESTIONS 1-9: 2
1. LITTLE INTEREST OR PLEASURE IN DOING THINGS: 1
SUM OF ALL RESPONSES TO PHQ9 QUESTIONS 1 & 2: 2
SUM OF ALL RESPONSES TO PHQ QUESTIONS 1-9: 2
SUM OF ALL RESPONSES TO PHQ QUESTIONS 1-9: 2

## 2023-11-14 NOTE — PROGRESS NOTES
Subjective:   Trev Slaughter is a 53 y.o. male  established here with complaints of Follow-up, Hypertension, and Cholesterol Problem   Reports left knee sx went well, only wears a brace when working due to frequent bending at work, otherwise its not causing any issues. Patient presenting for hypertension, hyperlipidemia followup. Not being so good at taking his med consistently. Denies CP, sob, ha, dizziness. Sleep apnea, wishing to get Eval for inspire, has difficult time with CPAP    Patient Active Problem List   Diagnosis    Cardiovascular stress test abnormal    Lumbosacral radiculopathy    Essential hypertension    Benign prostatic hyperplasia with lower urinary tract symptoms    Severe obesity (HCC)    Thyroid activity decreased    Snoring    Leukopenia    Chronic kidney disease, stage 2 (mild)    Atypical chest pain    Right knee pain    2019-nCoV acute respiratory disease    Acute kidney failure with tubular necrosis (HCC)    Hyperglycemia, unspecified    Other viral pneumonia    Rhabdomyolysis    Tear of medial meniscus of knee      Current Outpatient Medications   Medication Sig Dispense Refill    famotidine (PEPCID) 20 MG tablet TAKE 1 TABLET BY MOUTH NIGHTLY 90 tablet 1    cloNIDine (CATAPRES-TTS-1) 0.1 MG/24HR PTWK Place 1 patch onto the skin once a week 12 patch 3    metoprolol succinate (TOPROL XL) 25 MG extended release tablet TAKE 1 TABLET BY MOUTH NIGHTLY.  INDICATIONS: HIGH BLOOD PRESSURE 30 tablet 5    amLODIPine (NORVASC) 5 MG tablet Take 1 tablet by mouth daily      diclofenac sodium (VOLTAREN) 1 % GEL APPLY TOPICALLY 4 TIMES A DAY      esomeprazole Magnesium (NEXIUM) 40 MG PACK Take 1 packet by mouth daily      levothyroxine (SYNTHROID) 137 MCG tablet Take 1 tablet by mouth every morning (before breakfast)      lisinopril (PRINIVIL;ZESTRIL) 40 MG tablet Take 1 tablet by mouth daily      pantoprazole (PROTONIX) 40 MG tablet Take 1 tablet by mouth daily      cyanocobalamin 500 MCG tablet

## 2023-12-27 ENCOUNTER — OFFICE VISIT (OUTPATIENT)
Age: 48
End: 2023-12-27
Payer: MEDICAID

## 2023-12-27 VITALS
BODY MASS INDEX: 37.66 KG/M2 | DIASTOLIC BLOOD PRESSURE: 88 MMHG | OXYGEN SATURATION: 98 % | HEIGHT: 71 IN | RESPIRATION RATE: 16 BRPM | SYSTOLIC BLOOD PRESSURE: 142 MMHG | WEIGHT: 269 LBS | HEART RATE: 66 BPM

## 2023-12-27 DIAGNOSIS — G47.33 OSA (OBSTRUCTIVE SLEEP APNEA): Primary | ICD-10-CM

## 2023-12-27 DIAGNOSIS — R09.81 NASAL CONGESTION: ICD-10-CM

## 2023-12-27 PROCEDURE — 3077F SYST BP >= 140 MM HG: CPT | Performed by: STUDENT IN AN ORGANIZED HEALTH CARE EDUCATION/TRAINING PROGRAM

## 2023-12-27 PROCEDURE — 99203 OFFICE O/P NEW LOW 30 MIN: CPT | Performed by: STUDENT IN AN ORGANIZED HEALTH CARE EDUCATION/TRAINING PROGRAM

## 2023-12-27 PROCEDURE — 3079F DIAST BP 80-89 MM HG: CPT | Performed by: STUDENT IN AN ORGANIZED HEALTH CARE EDUCATION/TRAINING PROGRAM

## 2024-01-03 ENCOUNTER — OFFICE VISIT (OUTPATIENT)
Age: 49
End: 2024-01-03
Payer: MEDICAID

## 2024-01-03 DIAGNOSIS — R39.11 BENIGN PROSTATIC HYPERPLASIA WITH URINARY HESITANCY: Primary | ICD-10-CM

## 2024-01-03 DIAGNOSIS — Z12.5 PROSTATE CANCER SCREENING: ICD-10-CM

## 2024-01-03 DIAGNOSIS — N40.1 BENIGN PROSTATIC HYPERPLASIA WITH URINARY HESITANCY: Primary | ICD-10-CM

## 2024-01-03 PROCEDURE — 99213 OFFICE O/P EST LOW 20 MIN: CPT | Performed by: UROLOGY

## 2024-01-03 ASSESSMENT — ENCOUNTER SYMPTOMS: BACK PAIN: 1

## 2024-01-03 NOTE — PROGRESS NOTES
Chief Complaint   Patient presents with    1 Year Follow Up    Annual Exam    Benign Prostatic Hypertrophy     1. Have you been to the ER, urgent care clinic since your last visit?  Hospitalized since your last visit?No    2. Have you seen or consulted any other health care providers outside of the [facility] since your last visit?  Include any pap smears or colon screening. No

## 2024-01-03 NOTE — PROGRESS NOTES
aHISTORY OF PRESENT ILLNESS  Rip Gray Jr. is a 48 y.o. male.   has a past medical history of Atypical chest pain, Cardiovascular stress test abnormal, Chronic kidney disease, stage 2 (mild), Chronic prostatitis, COVID-19, Dizziness, Essential hypertension, Kidney disease, chronic, stage II (GFR 60-89 ml/min), Leukopenia, Lumbosacral radiculopathy, Right knee pain, Snoring, Stomach ulcer, and Thyroid activity decreased.  has a past surgical history that includes [place] tooth extraction (2014); Colonoscopy (N/A, 2/27/2023); and orthopedic surgery (Right).  Chief Complaint   Patient presents with    1 Year Follow Up    Annual Exam    Benign Prostatic Hypertrophy     He is voiding easily.  The patient denies urinary frequency, urgency, dysuria or hematuria.    The patient denies a weak urinary stream, sense of incomplete emptying, straining or hesitancy.  He stopped tamsulosin a couple months ago.  He noticed his flow improved and he does not need it.  He no longer notices an intermittent stream.     He had severe low back pain 3 weeks ago.  He felt acute pain with sitting up.  It is improving.         1. Benign prostatic hyperplasia with urinary hesitancy  Overview:  BPH with some hesitancy; possible component of prostatitis, improved on tamsulosin.    Assessment & Plan:    Controlled.  He is voiding ok off tamsulosin.  He did not like anejaculation.    2. Prostate cancer screening  Overview:  PSA 2021: 0.9  11/4/2022: 1.0    Assessment & Plan:  Low PSA in the past.  Plan on another PSA age 51.       Allergies   Allergen Reactions    Hydrocodone-Acetaminophen Other (See Comments)     hallucinations        Past Medical History:  PMHx (including negatives):  has a past medical history of Atypical chest pain, Cardiovascular stress test abnormal, Chronic kidney disease, stage 2 (mild), Chronic prostatitis, COVID-19, Dizziness, Essential hypertension, Kidney disease, chronic, stage II (GFR 60-89 ml/min), Leukopenia,

## 2024-02-02 PROBLEM — Z12.5 PROSTATE CANCER SCREENING: Status: RESOLVED | Noted: 2022-12-21 | Resolved: 2024-02-02

## 2024-02-05 DIAGNOSIS — I10 ESSENTIAL (PRIMARY) HYPERTENSION: ICD-10-CM

## 2024-02-05 NOTE — TELEPHONE ENCOUNTER
Requested Prescriptions     Pending Prescriptions Disp Refills    lisinopril (PRINIVIL;ZESTRIL) 40 MG tablet [Pharmacy Med Name: LISINOPRIL 40 MG TABLET] 90 tablet 3     Sig: TAKE 1 TABLET BY MOUTH EVERY DAY

## 2024-02-06 RX ORDER — LISINOPRIL 40 MG/1
40 TABLET ORAL DAILY
Qty: 90 TABLET | Refills: 3 | Status: SHIPPED | OUTPATIENT
Start: 2024-02-06

## 2024-02-25 DIAGNOSIS — E03.9 HYPOTHYROIDISM, UNSPECIFIED: ICD-10-CM

## 2024-02-26 RX ORDER — LEVOTHYROXINE SODIUM 137 UG/1
TABLET ORAL
Qty: 90 TABLET | Refills: 3 | Status: SHIPPED | OUTPATIENT
Start: 2024-02-26

## 2024-02-26 NOTE — TELEPHONE ENCOUNTER
Requested Prescriptions     Pending Prescriptions Disp Refills    levothyroxine (SYNTHROID) 137 MCG tablet [Pharmacy Med Name: LEVOTHYROXINE 137 MCG TABLET] 90 tablet 3     Sig: TAKE 1 TABLET BY MOUTH EVERY DAY BEFORE BREAKFAST FOR LOW THYROID LEVELS

## 2024-04-09 DIAGNOSIS — K21.9 GASTRO-ESOPHAGEAL REFLUX DISEASE WITHOUT ESOPHAGITIS: ICD-10-CM

## 2024-04-09 NOTE — TELEPHONE ENCOUNTER
Requested Prescriptions     Pending Prescriptions Disp Refills    famotidine (PEPCID) 20 MG tablet [Pharmacy Med Name: FAMOTIDINE 20 MG TABLET] 90 tablet 1     Sig: TAKE 1 TABLET BY MOUTH EVERY DAY AT NIGHT

## 2024-04-10 RX ORDER — FAMOTIDINE 20 MG/1
20 TABLET, FILM COATED ORAL NIGHTLY
Qty: 90 TABLET | Refills: 1 | Status: SHIPPED | OUTPATIENT
Start: 2024-04-10

## 2024-04-30 DIAGNOSIS — I10 ESSENTIAL (PRIMARY) HYPERTENSION: ICD-10-CM

## 2024-05-01 RX ORDER — METOPROLOL SUCCINATE 25 MG/1
TABLET, EXTENDED RELEASE ORAL
Qty: 30 TABLET | Refills: 5 | Status: SHIPPED | OUTPATIENT
Start: 2024-05-01

## 2024-05-01 NOTE — TELEPHONE ENCOUNTER
Requested Prescriptions     Pending Prescriptions Disp Refills    metoprolol succinate (TOPROL XL) 25 MG extended release tablet [Pharmacy Med Name: METOPROLOL SUCC ER 25 MG TAB] 30 tablet 5     Sig: TAKE 1 TABLET BY MOUTH NIGHTLY. INDICATIONS: HIGH BLOOD PRESSURE

## 2024-05-14 ENCOUNTER — OFFICE VISIT (OUTPATIENT)
Facility: CLINIC | Age: 49
End: 2024-05-14
Payer: MEDICAID

## 2024-05-14 VITALS
HEART RATE: 69 BPM | SYSTOLIC BLOOD PRESSURE: 130 MMHG | TEMPERATURE: 97.5 F | HEIGHT: 71 IN | BODY MASS INDEX: 37.38 KG/M2 | DIASTOLIC BLOOD PRESSURE: 88 MMHG | RESPIRATION RATE: 18 BRPM | OXYGEN SATURATION: 98 % | WEIGHT: 267 LBS

## 2024-05-14 DIAGNOSIS — E78.5 HYPERLIPIDEMIA, UNSPECIFIED HYPERLIPIDEMIA TYPE: ICD-10-CM

## 2024-05-14 DIAGNOSIS — E55.9 VITAMIN D DEFICIENCY: ICD-10-CM

## 2024-05-14 DIAGNOSIS — I10 ESSENTIAL (PRIMARY) HYPERTENSION: ICD-10-CM

## 2024-05-14 DIAGNOSIS — R73.9 HYPERGLYCEMIA, UNSPECIFIED: ICD-10-CM

## 2024-05-14 DIAGNOSIS — E03.9 HYPOTHYROIDISM, UNSPECIFIED TYPE: Primary | ICD-10-CM

## 2024-05-14 PROCEDURE — 3075F SYST BP GE 130 - 139MM HG: CPT | Performed by: NURSE PRACTITIONER

## 2024-05-14 PROCEDURE — 3079F DIAST BP 80-89 MM HG: CPT | Performed by: NURSE PRACTITIONER

## 2024-05-14 PROCEDURE — 99214 OFFICE O/P EST MOD 30 MIN: CPT | Performed by: NURSE PRACTITIONER

## 2024-05-14 RX ORDER — ERGOCALCIFEROL 1.25 MG/1
50000 CAPSULE ORAL WEEKLY
Qty: 12 CAPSULE | Refills: 1 | Status: SHIPPED | OUTPATIENT
Start: 2024-05-14

## 2024-05-14 SDOH — ECONOMIC STABILITY: FOOD INSECURITY: WITHIN THE PAST 12 MONTHS, THE FOOD YOU BOUGHT JUST DIDN'T LAST AND YOU DIDN'T HAVE MONEY TO GET MORE.: NEVER TRUE

## 2024-05-14 SDOH — ECONOMIC STABILITY: HOUSING INSECURITY
IN THE LAST 12 MONTHS, WAS THERE A TIME WHEN YOU DID NOT HAVE A STEADY PLACE TO SLEEP OR SLEPT IN A SHELTER (INCLUDING NOW)?: NO

## 2024-05-14 SDOH — ECONOMIC STABILITY: FOOD INSECURITY: WITHIN THE PAST 12 MONTHS, YOU WORRIED THAT YOUR FOOD WOULD RUN OUT BEFORE YOU GOT MONEY TO BUY MORE.: NEVER TRUE

## 2024-05-14 SDOH — ECONOMIC STABILITY: INCOME INSECURITY: HOW HARD IS IT FOR YOU TO PAY FOR THE VERY BASICS LIKE FOOD, HOUSING, MEDICAL CARE, AND HEATING?: NOT VERY HARD

## 2024-05-14 ASSESSMENT — ANXIETY QUESTIONNAIRES
7. FEELING AFRAID AS IF SOMETHING AWFUL MIGHT HAPPEN: SEVERAL DAYS
3. WORRYING TOO MUCH ABOUT DIFFERENT THINGS: NOT AT ALL
GAD7 TOTAL SCORE: 8
4. TROUBLE RELAXING: NOT AT ALL
IF YOU CHECKED OFF ANY PROBLEMS ON THIS QUESTIONNAIRE, HOW DIFFICULT HAVE THESE PROBLEMS MADE IT FOR YOU TO DO YOUR WORK, TAKE CARE OF THINGS AT HOME, OR GET ALONG WITH OTHER PEOPLE: NOT DIFFICULT AT ALL
5. BEING SO RESTLESS THAT IT IS HARD TO SIT STILL: NOT AT ALL
2. NOT BEING ABLE TO STOP OR CONTROL WORRYING: MORE THAN HALF THE DAYS
6. BECOMING EASILY ANNOYED OR IRRITABLE: NEARLY EVERY DAY

## 2024-05-14 ASSESSMENT — PATIENT HEALTH QUESTIONNAIRE - PHQ9
6. FEELING BAD ABOUT YOURSELF - OR THAT YOU ARE A FAILURE OR HAVE LET YOURSELF OR YOUR FAMILY DOWN: SEVERAL DAYS
SUM OF ALL RESPONSES TO PHQ QUESTIONS 1-9: 10
4. FEELING TIRED OR HAVING LITTLE ENERGY: MORE THAN HALF THE DAYS
10. IF YOU CHECKED OFF ANY PROBLEMS, HOW DIFFICULT HAVE THESE PROBLEMS MADE IT FOR YOU TO DO YOUR WORK, TAKE CARE OF THINGS AT HOME, OR GET ALONG WITH OTHER PEOPLE: SOMEWHAT DIFFICULT
9. THOUGHTS THAT YOU WOULD BE BETTER OFF DEAD, OR OF HURTING YOURSELF: NOT AT ALL
7. TROUBLE CONCENTRATING ON THINGS, SUCH AS READING THE NEWSPAPER OR WATCHING TELEVISION: NOT AT ALL
5. POOR APPETITE OR OVEREATING: NOT AT ALL
SUM OF ALL RESPONSES TO PHQ QUESTIONS 1-9: 10
SUM OF ALL RESPONSES TO PHQ9 QUESTIONS 1 & 2: 4
1. LITTLE INTEREST OR PLEASURE IN DOING THINGS: MORE THAN HALF THE DAYS
3. TROUBLE FALLING OR STAYING ASLEEP: NEARLY EVERY DAY
SUM OF ALL RESPONSES TO PHQ QUESTIONS 1-9: 10
8. MOVING OR SPEAKING SO SLOWLY THAT OTHER PEOPLE COULD HAVE NOTICED. OR THE OPPOSITE, BEING SO FIGETY OR RESTLESS THAT YOU HAVE BEEN MOVING AROUND A LOT MORE THAN USUAL: NOT AT ALL
SUM OF ALL RESPONSES TO PHQ QUESTIONS 1-9: 10
2. FEELING DOWN, DEPRESSED OR HOPELESS: MORE THAN HALF THE DAYS

## 2024-05-14 NOTE — PROGRESS NOTES
\"Have you been to the ER, urgent care clinic since your last visit?  Hospitalized since your last visit?\"    NO    “Have you seen or consulted any other health care providers outside of [facility] since your last visit?”    NO      Chief Complaint   Patient presents with    6 Month Follow-Up     BP (!) 130/90 (Site: Right Upper Arm, Position: Sitting, Cuff Size: Large Adult)   Pulse 69   Temp 97.5 °F (36.4 °C) (Temporal)   Resp 18   Ht 1.803 m (5' 11\")   Wt 121.1 kg (267 lb)   SpO2 98%   BMI 37.24 kg/m²

## 2024-05-14 NOTE — PROGRESS NOTES
[facility] MEDICINE  [address] [place] [address] [address]   [place] [address]  (P) [phone]   (F) [phone]    Subjective:   Rip Gray Jr. is a 48 y.o. male  established here with complaints of 6 Month Follow-Up  Patient presenting for hypertension, hyperlipidemia, and vitamin D deficiency followup.  Patient reports blood pressures at home most frequently between 120/80 and 130/88 . Patient reports no new symptoms Patient denies chest pain, shortness of breath, weakness, and orthostatic hypotension. Patient reports  good compliance with medications and no side effects from medications noted. Current treatments include diet modification, weight loss.     Patient Active Problem List   Diagnosis    Cardiovascular stress test abnormal    Lumbosacral radiculopathy    Essential hypertension    Benign prostatic hyperplasia with lower urinary tract symptoms    Severe obesity (HCC)    Thyroid activity decreased    Snoring    Leukopenia    Chronic kidney disease, stage 2 (mild)    Atypical chest pain    Right knee pain    2019-nCoV acute respiratory disease    Acute kidney failure with tubular necrosis (HCC)    Hyperglycemia, unspecified    Other viral pneumonia    Rhabdomyolysis    Tear of medial meniscus of knee      Current Outpatient Medications   Medication Sig Dispense Refill    vitamin D (ERGOCALCIFEROL) 1.25 MG (04168 UT) CAPS capsule Take 1 capsule by mouth once a week 12 capsule 1    metoprolol succinate (TOPROL XL) 25 MG extended release tablet TAKE 1 TABLET BY MOUTH NIGHTLY. INDICATIONS: HIGH BLOOD PRESSURE 30 tablet 5    famotidine (PEPCID) 20 MG tablet TAKE 1 TABLET BY MOUTH EVERY DAY AT NIGHT 90 tablet 1    levothyroxine (SYNTHROID) 137 MCG tablet TAKE 1 TABLET BY MOUTH EVERY DAY BEFORE BREAKFAST FOR LOW THYROID LEVELS 90 tablet 3    lisinopril (PRINIVIL;ZESTRIL) 40 MG tablet TAKE 1 TABLET BY MOUTH EVERY DAY 90 tablet 3    amLODIPine (NORVASC) 5 MG tablet Take 1 tablet by mouth daily

## 2024-07-18 ENCOUNTER — OFFICE VISIT (OUTPATIENT)
Facility: CLINIC | Age: 49
End: 2024-07-18
Payer: MEDICAID

## 2024-07-18 VITALS
BODY MASS INDEX: 35.56 KG/M2 | HEIGHT: 71 IN | WEIGHT: 254 LBS | HEART RATE: 99 BPM | RESPIRATION RATE: 18 BRPM | TEMPERATURE: 97.8 F | OXYGEN SATURATION: 98 % | SYSTOLIC BLOOD PRESSURE: 116 MMHG | DIASTOLIC BLOOD PRESSURE: 85 MMHG

## 2024-07-18 DIAGNOSIS — E03.9 HYPOTHYROIDISM, UNSPECIFIED TYPE: ICD-10-CM

## 2024-07-18 DIAGNOSIS — R73.03 PREDIABETES: ICD-10-CM

## 2024-07-18 DIAGNOSIS — R63.4 UNINTENTIONAL WEIGHT LOSS: ICD-10-CM

## 2024-07-18 DIAGNOSIS — R63.4 UNINTENTIONAL WEIGHT LOSS: Primary | ICD-10-CM

## 2024-07-18 PROBLEM — J06.9 2019-NCOV ACUTE RESPIRATORY DISEASE: Status: RESOLVED | Noted: 2022-11-30 | Resolved: 2024-07-18

## 2024-07-18 PROBLEM — U07.1 2019-NCOV ACUTE RESPIRATORY DISEASE: Status: RESOLVED | Noted: 2022-11-30 | Resolved: 2024-07-18

## 2024-07-18 PROCEDURE — 99214 OFFICE O/P EST MOD 30 MIN: CPT | Performed by: FAMILY MEDICINE

## 2024-07-18 PROCEDURE — 3079F DIAST BP 80-89 MM HG: CPT | Performed by: FAMILY MEDICINE

## 2024-07-18 PROCEDURE — 3074F SYST BP LT 130 MM HG: CPT | Performed by: FAMILY MEDICINE

## 2024-07-18 NOTE — PROGRESS NOTES
\"Have you been to the ER, urgent care clinic since your last visit?  Hospitalized since your last visit?\"    NO    “Have you seen or consulted any other health care providers outside of [facility] since your last visit?”    NO    Chief Complaint   Patient presents with    Weight Loss     /85 (Site: Left Upper Arm, Position: Sitting, Cuff Size: Large Adult)   Pulse 99   Temp 97.8 °F (36.6 °C) (Temporal)   Resp 18   Ht 1.803 m (5' 11\")   Wt 115.2 kg (254 lb)   SpO2 98%   BMI 35.43 kg/m²               Click Here for Release of Records Request    
     Heart sounds: Normal heart sounds.   Pulmonary:      Effort: Pulmonary effort is normal. No respiratory distress.      Breath sounds: Normal breath sounds.   Musculoskeletal:         General: Normal range of motion.      Cervical back: Normal range of motion.   Skin:     General: Skin is warm and dry.   Neurological:      General: No focal deficit present.      Mental Status: He is alert and oriented to person, place, and time.   Psychiatric:         Mood and Affect: Mood normal.         Behavior: Behavior normal.         Thought Content: Thought content normal.         Judgment: Judgment normal.          Assessment & Plan   Diagnosis Orders   1. Unintentional weight loss  Comprehensive Metabolic Panel    CBC    Hemoglobin A1C    TSH + Free T4 Panel      2. Prediabetes  Hemoglobin A1C      3. Hypothyroidism, unspecified type  TSH + Free T4 Panel        Rip was seen today for weight loss.    Diagnoses and all orders for this visit:    Unintentional weight loss  -     Comprehensive Metabolic Panel; Future  -     CBC; Future  -     Hemoglobin A1C; Future  -     TSH + Free T4 Panel  Down 15lbs in last 2 months. Symptoms sound like they have begun to improve. Patient does have history of gastric ulcers. Advised follow up with GI. Continue esomeprazole and famotidine. Labs as above. RTC x2 months for weight check.     Prediabetes  -     Hemoglobin A1C; Future    Hypothyroidism, unspecified type  -     TSH + Free T4 Panel  Continue levothyroxine 137mcg daily for now. Labs pending.         Follow-up and Dispositions    Return in about 2 months (around 9/18/2024) for Unintentional weight loss.       On this date, I spent 30 minutes in chart review, face to face with patient discussing medical problems, and in documentation.       Samantha Mane, DO

## 2024-07-19 LAB
ALBUMIN SERPL-MCNC: 4.7 G/DL (ref 4.1–5.1)
ALP SERPL-CCNC: 69 IU/L (ref 44–121)
ALT SERPL-CCNC: 23 IU/L (ref 0–44)
AST SERPL-CCNC: 19 IU/L (ref 0–40)
BASOPHILS # BLD AUTO: 0.1 X10E3/UL (ref 0–0.2)
BASOPHILS NFR BLD AUTO: 2 %
BILIRUB SERPL-MCNC: 0.6 MG/DL (ref 0–1.2)
BUN SERPL-MCNC: 15 MG/DL (ref 6–24)
BUN/CREAT SERPL: 11 (ref 9–20)
CALCIUM SERPL-MCNC: 9.4 MG/DL (ref 8.7–10.2)
CHLORIDE SERPL-SCNC: 104 MMOL/L (ref 96–106)
CHOLEST SERPL-MCNC: 156 MG/DL (ref 100–199)
CO2 SERPL-SCNC: 28 MMOL/L (ref 20–29)
CREAT SERPL-MCNC: 1.38 MG/DL (ref 0.76–1.27)
EGFRCR SERPLBLD CKD-EPI 2021: 63 ML/MIN/1.73
EOSINOPHIL # BLD AUTO: 0.1 X10E3/UL (ref 0–0.4)
EOSINOPHIL NFR BLD AUTO: 2 %
ERYTHROCYTE [DISTWIDTH] IN BLOOD BY AUTOMATED COUNT: 12.8 % (ref 11.6–15.4)
GLOBULIN SER CALC-MCNC: 2.3 G/DL (ref 1.5–4.5)
GLUCOSE SERPL-MCNC: 106 MG/DL (ref 70–99)
HCT VFR BLD AUTO: 44.2 % (ref 37.5–51)
HDLC SERPL-MCNC: 41 MG/DL
HGB BLD-MCNC: 14.5 G/DL (ref 13–17.7)
IMM GRANULOCYTES # BLD AUTO: 0 X10E3/UL (ref 0–0.1)
IMM GRANULOCYTES NFR BLD AUTO: 0 %
LDLC SERPL CALC-MCNC: 102 MG/DL (ref 0–99)
LYMPHOCYTES # BLD AUTO: 1.1 X10E3/UL (ref 0.7–3.1)
LYMPHOCYTES NFR BLD AUTO: 27 %
MCH RBC QN AUTO: 29.1 PG (ref 26.6–33)
MCHC RBC AUTO-ENTMCNC: 32.8 G/DL (ref 31.5–35.7)
MCV RBC AUTO: 89 FL (ref 79–97)
MONOCYTES # BLD AUTO: 0.2 X10E3/UL (ref 0.1–0.9)
MONOCYTES NFR BLD AUTO: 6 %
NEUTROPHILS # BLD AUTO: 2.5 X10E3/UL (ref 1.4–7)
NEUTROPHILS NFR BLD AUTO: 63 %
PLATELET # BLD AUTO: 224 X10E3/UL (ref 150–450)
POTASSIUM SERPL-SCNC: 4.5 MMOL/L (ref 3.5–5.2)
PROT SERPL-MCNC: 7 G/DL (ref 6–8.5)
RBC # BLD AUTO: 4.99 X10E6/UL (ref 4.14–5.8)
SODIUM SERPL-SCNC: 143 MMOL/L (ref 134–144)
T4 FREE SERPL-MCNC: 1.53 NG/DL (ref 0.82–1.77)
TRIGL SERPL-MCNC: 65 MG/DL (ref 0–149)
TSH SERPL DL<=0.005 MIU/L-ACNC: 0.44 UIU/ML (ref 0.45–4.5)
VLDLC SERPL CALC-MCNC: 13 MG/DL (ref 5–40)
WBC # BLD AUTO: 3.9 X10E3/UL (ref 3.4–10.8)

## 2024-07-29 ASSESSMENT — ENCOUNTER SYMPTOMS: ABDOMINAL PAIN: 1

## 2024-08-11 DIAGNOSIS — K21.9 GASTRO-ESOPHAGEAL REFLUX DISEASE WITHOUT ESOPHAGITIS: ICD-10-CM

## 2024-08-13 RX ORDER — FAMOTIDINE 20 MG/1
20 TABLET, FILM COATED ORAL NIGHTLY
Qty: 90 TABLET | Refills: 1 | Status: SHIPPED | OUTPATIENT
Start: 2024-08-13

## 2024-09-24 ENCOUNTER — OFFICE VISIT (OUTPATIENT)
Facility: CLINIC | Age: 49
End: 2024-09-24
Payer: MEDICAID

## 2024-09-24 VITALS
OXYGEN SATURATION: 99 % | DIASTOLIC BLOOD PRESSURE: 85 MMHG | BODY MASS INDEX: 35.98 KG/M2 | RESPIRATION RATE: 18 BRPM | HEART RATE: 60 BPM | WEIGHT: 257 LBS | HEIGHT: 71 IN | TEMPERATURE: 97.3 F | SYSTOLIC BLOOD PRESSURE: 137 MMHG

## 2024-09-24 DIAGNOSIS — E78.5 HYPERLIPIDEMIA, UNSPECIFIED HYPERLIPIDEMIA TYPE: ICD-10-CM

## 2024-09-24 DIAGNOSIS — E03.9 HYPOTHYROIDISM, UNSPECIFIED TYPE: Primary | ICD-10-CM

## 2024-09-24 DIAGNOSIS — M77.8 LEFT ELBOW TENDONITIS: ICD-10-CM

## 2024-09-24 DIAGNOSIS — I10 ESSENTIAL (PRIMARY) HYPERTENSION: ICD-10-CM

## 2024-09-24 PROCEDURE — 3075F SYST BP GE 130 - 139MM HG: CPT | Performed by: NURSE PRACTITIONER

## 2024-09-24 PROCEDURE — 99214 OFFICE O/P EST MOD 30 MIN: CPT | Performed by: NURSE PRACTITIONER

## 2024-09-24 PROCEDURE — 3079F DIAST BP 80-89 MM HG: CPT | Performed by: NURSE PRACTITIONER

## 2024-09-24 RX ORDER — METHOCARBAMOL 750 MG/1
750 TABLET, FILM COATED ORAL 4 TIMES DAILY
Qty: 40 TABLET | Refills: 0 | Status: SHIPPED | OUTPATIENT
Start: 2024-09-24 | End: 2024-10-04

## 2024-09-24 RX ORDER — AMLODIPINE BESYLATE 5 MG/1
5 TABLET ORAL DAILY
Qty: 90 TABLET | Refills: 1 | Status: SHIPPED | OUTPATIENT
Start: 2024-09-24

## 2024-09-24 RX ORDER — IBUPROFEN 600 MG/1
600 TABLET, FILM COATED ORAL 3 TIMES DAILY PRN
Qty: 30 TABLET | Refills: 0 | Status: SHIPPED | OUTPATIENT
Start: 2024-09-24

## 2024-09-24 RX ORDER — PREDNISONE 20 MG/1
20 TABLET ORAL 2 TIMES DAILY
Qty: 10 TABLET | Refills: 0 | Status: SHIPPED | OUTPATIENT
Start: 2024-09-24 | End: 2024-09-29

## 2024-09-24 ASSESSMENT — PATIENT HEALTH QUESTIONNAIRE - PHQ9
7. TROUBLE CONCENTRATING ON THINGS, SUCH AS READING THE NEWSPAPER OR WATCHING TELEVISION: NOT AT ALL
SUM OF ALL RESPONSES TO PHQ9 QUESTIONS 1 & 2: 0
SUM OF ALL RESPONSES TO PHQ QUESTIONS 1-9: 6
8. MOVING OR SPEAKING SO SLOWLY THAT OTHER PEOPLE COULD HAVE NOTICED. OR THE OPPOSITE, BEING SO FIGETY OR RESTLESS THAT YOU HAVE BEEN MOVING AROUND A LOT MORE THAN USUAL: NOT AT ALL
SUM OF ALL RESPONSES TO PHQ QUESTIONS 1-9: 6
10. IF YOU CHECKED OFF ANY PROBLEMS, HOW DIFFICULT HAVE THESE PROBLEMS MADE IT FOR YOU TO DO YOUR WORK, TAKE CARE OF THINGS AT HOME, OR GET ALONG WITH OTHER PEOPLE: NOT DIFFICULT AT ALL
SUM OF ALL RESPONSES TO PHQ QUESTIONS 1-9: 6
SUM OF ALL RESPONSES TO PHQ QUESTIONS 1-9: 6
5. POOR APPETITE OR OVEREATING: NOT AT ALL
2. FEELING DOWN, DEPRESSED OR HOPELESS: NOT AT ALL
3. TROUBLE FALLING OR STAYING ASLEEP: NEARLY EVERY DAY
1. LITTLE INTEREST OR PLEASURE IN DOING THINGS: NOT AT ALL
4. FEELING TIRED OR HAVING LITTLE ENERGY: NEARLY EVERY DAY
6. FEELING BAD ABOUT YOURSELF - OR THAT YOU ARE A FAILURE OR HAVE LET YOURSELF OR YOUR FAMILY DOWN: NOT AT ALL
9. THOUGHTS THAT YOU WOULD BE BETTER OFF DEAD, OR OF HURTING YOURSELF: NOT AT ALL

## 2024-09-24 ASSESSMENT — ANXIETY QUESTIONNAIRES
7. FEELING AFRAID AS IF SOMETHING AWFUL MIGHT HAPPEN: NOT AT ALL
1. FEELING NERVOUS, ANXIOUS, OR ON EDGE: NOT AT ALL
3. WORRYING TOO MUCH ABOUT DIFFERENT THINGS: NOT AT ALL
4. TROUBLE RELAXING: NOT AT ALL
2. NOT BEING ABLE TO STOP OR CONTROL WORRYING: NOT AT ALL
5. BEING SO RESTLESS THAT IT IS HARD TO SIT STILL: NOT AT ALL
GAD7 TOTAL SCORE: 0
IF YOU CHECKED OFF ANY PROBLEMS ON THIS QUESTIONNAIRE, HOW DIFFICULT HAVE THESE PROBLEMS MADE IT FOR YOU TO DO YOUR WORK, TAKE CARE OF THINGS AT HOME, OR GET ALONG WITH OTHER PEOPLE: NOT DIFFICULT AT ALL
6. BECOMING EASILY ANNOYED OR IRRITABLE: NOT AT ALL

## 2024-09-25 LAB — TSH SERPL DL<=0.005 MIU/L-ACNC: 1.08 UIU/ML (ref 0.45–4.5)

## 2024-11-15 ENCOUNTER — TELEMEDICINE (OUTPATIENT)
Facility: CLINIC | Age: 49
End: 2024-11-15
Payer: MEDICAID

## 2024-11-15 DIAGNOSIS — E03.9 HYPOTHYROIDISM, UNSPECIFIED TYPE: ICD-10-CM

## 2024-11-15 DIAGNOSIS — K21.9 GASTRO-ESOPHAGEAL REFLUX DISEASE WITHOUT ESOPHAGITIS: Primary | ICD-10-CM

## 2024-11-15 DIAGNOSIS — I10 ESSENTIAL HYPERTENSION: ICD-10-CM

## 2024-11-15 PROCEDURE — 99214 OFFICE O/P EST MOD 30 MIN: CPT | Performed by: NURSE PRACTITIONER

## 2024-11-15 RX ORDER — FAMOTIDINE 40 MG/1
40 TABLET, FILM COATED ORAL EVERY EVENING
Qty: 90 TABLET | Refills: 3 | Status: SHIPPED | OUTPATIENT
Start: 2024-11-15

## 2024-11-15 RX ORDER — SUCRALFATE ORAL 1 G/10ML
1 SUSPENSION ORAL 4 TIMES DAILY
Qty: 1200 ML | Refills: 3 | Status: SHIPPED | OUTPATIENT
Start: 2024-11-15

## 2024-11-15 RX ORDER — PANTOPRAZOLE SODIUM 40 MG/1
40 TABLET, DELAYED RELEASE ORAL
Qty: 90 TABLET | Refills: 1 | Status: SHIPPED | OUTPATIENT
Start: 2024-11-15

## 2024-11-15 NOTE — PROGRESS NOTES
\"Have you been to the ER, urgent care clinic since your last visit?  Hospitalized since your last visit?\"    NO    “Have you seen or consulted any other health care providers outside our system since your last visit?”    NO    Chief Complaint   Patient presents with    3 Month Follow-Up      There were no vitals taken for this visit.            No indicators present

## 2024-11-15 NOTE — PATIENT INSTRUCTIONS
Yuni Mayen  [address] [place] [id number]                   Phone: [phone]

## 2024-11-15 NOTE — PROGRESS NOTES
[facility]  [address] [place] [address] [address]   [place] [address]  (P) [phone]   (F) [phone]    Rip Gray Jr. who was evaluated through a synchronous (real-time) audio-video encounter, and/or the patient's healthcare decision maker, is aware that it is a billable service, which includes applicable co-pays, with coverage as determined by the patient's insurance carrier. Rip Gray Jr. provided verbal consent to proceed and patient identification was verified. This visit was conducted pursuant to the emergency declaration under the Li Act and the National Emergencies Act, 1135 waiver authority and the Coronavirus Preparedness and Response Supplemental Appropriations Act. A caregiver was present when appropriate. Ability to conduct physical exam was limited. The patient was located at home in a state where the provider was licensed to provide care.     Rip Gray Jr. (: 1975) is a 49 y.o. male, established patient, here for evaluation of the following chief complaint(s):   3 Month Follow-Up         SUBJECTIVE/OBJECTIVE:  HPI    Patient is a 49 y.o. male here today with complaints of 3 Month Follow-Up  Eating habits have been changed dramatically, eating every 1-2 days, does feel more fatigue, on the eat dayseats peanut butter crackers, 2 bottles of electrolytes, then a soup from [employer].   Does reports having GI issues that include nausea, regardless of food intake. Long hx of gastric ulcers.   Have lost 20 lbs since last visit current weight 235 lbs    Review of Systems   All other systems reviewed and are negative.         No data to display                  Physical Exam  [INSTRUCTIONS:  \"[x]\" Indicates a positive item  \"[]\" Indicates a negative item  -- DELETE ALL ITEMS NOT EXAMINED]    Constitutional: [x] Appears well-developed and well-nourished [x] No apparent distress      [] Abnormal -     Mental status: [x] Alert and awake  [x] Oriented to

## 2025-03-03 DIAGNOSIS — I10 ESSENTIAL (PRIMARY) HYPERTENSION: ICD-10-CM

## 2025-03-04 RX ORDER — LISINOPRIL 40 MG/1
40 TABLET ORAL DAILY
Qty: 90 TABLET | Refills: 3 | Status: SHIPPED | OUTPATIENT
Start: 2025-03-04

## 2025-03-13 DIAGNOSIS — I10 ESSENTIAL (PRIMARY) HYPERTENSION: ICD-10-CM

## 2025-03-13 DIAGNOSIS — E03.9 HYPOTHYROIDISM, UNSPECIFIED: ICD-10-CM

## 2025-03-13 RX ORDER — LEVOTHYROXINE SODIUM 137 UG/1
TABLET ORAL
Qty: 90 TABLET | Refills: 3 | Status: SHIPPED | OUTPATIENT
Start: 2025-03-13

## 2025-03-13 RX ORDER — METOPROLOL SUCCINATE 25 MG/1
TABLET, EXTENDED RELEASE ORAL
Qty: 30 TABLET | Refills: 5 | Status: SHIPPED | OUTPATIENT
Start: 2025-03-13

## 2025-03-13 NOTE — TELEPHONE ENCOUNTER
Requested Prescriptions     Pending Prescriptions Disp Refills    levothyroxine (SYNTHROID) 137 MCG tablet [Pharmacy Med Name: LEVOTHYROXINE 137 MCG TABLET] 90 tablet 3     Sig: TAKE 1 TABLET BY MOUTH EVERY DAY BEFORE BREAKFAST FOR LOW THYROID LEVELS    metoprolol succinate (TOPROL XL) 25 MG extended release tablet [Pharmacy Med Name: METOPROLOL SUCC ER 25 MG TAB] 30 tablet 5     Sig: TAKE 1 TABLET BY MOUTH NIGHTLY. INDICATIONS: HIGH BLOOD PRESSURE     Date of last OV: 11.15.2024  Future OV visit scheduled:  [] Yes -> Date:   [x] No    Last Refill: [] N/A - levothyroxine  Date: 02.26.2024  Qty: 90  # of refills: 3    Last Refill: [] N/A - toprol xl  Date: 05.01.2024  Qty: 30  # of refills: 5    Med pending for provider review:  [x] Yes  [] No (provide reason why):     Requested Pharmacy updated in [facility]:  [] Yes    Applicable labs (provide date of completion):  [] Diabetic med- A1c:  [] Cholesterol med- Lipids:  [] BP med- CMP or BMP:   [] Thyroid med- TSH:  [] Gout med- Uric acid:  [] Prostate med- PSA:  [] Other (provide what type of med and lab):    Additional notes:

## 2025-03-16 DIAGNOSIS — K21.9 GASTRO-ESOPHAGEAL REFLUX DISEASE WITHOUT ESOPHAGITIS: ICD-10-CM

## 2025-03-17 RX ORDER — PANTOPRAZOLE SODIUM 40 MG/1
40 TABLET, DELAYED RELEASE ORAL
Qty: 90 TABLET | Refills: 1 | Status: SHIPPED | OUTPATIENT
Start: 2025-03-17

## 2025-03-17 NOTE — TELEPHONE ENCOUNTER
Requested Prescriptions     Pending Prescriptions Disp Refills    pantoprazole (PROTONIX) 40 MG tablet [Pharmacy Med Name: PANTOPRAZOLE SOD DR 40 MG TAB] 90 tablet 1     Sig: TAKE 1 TABLET BY MOUTH EVERY DAY BEFORE BREAKFAST

## 2025-03-17 NOTE — TELEPHONE ENCOUNTER
3.17.25 Spoke to pt and notified him that the Rx was sent, pt wanted to est care at [facility] office on [address]

## (undated) DEVICE — MASK ANES INF SZ 2 PREM TAIL VLV INFL PRT UNSCENTED SGL PT